# Patient Record
Sex: FEMALE | Race: WHITE | NOT HISPANIC OR LATINO | Employment: UNEMPLOYED | URBAN - METROPOLITAN AREA
[De-identification: names, ages, dates, MRNs, and addresses within clinical notes are randomized per-mention and may not be internally consistent; named-entity substitution may affect disease eponyms.]

---

## 2021-01-29 ENCOUNTER — TELEPHONE (OUTPATIENT)
Dept: SPORTS MEDICINE | Facility: CLINIC | Age: 51
End: 2021-01-29

## 2021-09-10 ENCOUNTER — OFFICE VISIT (OUTPATIENT)
Dept: INTERNAL MEDICINE | Facility: CLINIC | Age: 51
End: 2021-09-10
Payer: COMMERCIAL

## 2021-09-10 ENCOUNTER — TELEPHONE (OUTPATIENT)
Dept: INTERNAL MEDICINE | Facility: CLINIC | Age: 51
End: 2021-09-10

## 2021-09-10 VITALS — HEIGHT: 68 IN | BODY MASS INDEX: 32.58 KG/M2 | WEIGHT: 215 LBS

## 2021-09-10 DIAGNOSIS — Z12.4 ENCOUNTER FOR PAPANICOLAOU SMEAR FOR CERVICAL CANCER SCREENING: ICD-10-CM

## 2021-09-10 DIAGNOSIS — L50.9 HIVES OF UNKNOWN ORIGIN: ICD-10-CM

## 2021-09-10 DIAGNOSIS — R21 RASH AND NONSPECIFIC SKIN ERUPTION: Primary | ICD-10-CM

## 2021-09-10 DIAGNOSIS — Z00.00 ANNUAL PHYSICAL EXAM: ICD-10-CM

## 2021-09-10 DIAGNOSIS — F32.9 MAJOR DEPRESSION, CHRONIC: ICD-10-CM

## 2021-09-10 DIAGNOSIS — E66.09 CLASS 1 OBESITY DUE TO EXCESS CALORIES WITH BODY MASS INDEX (BMI) OF 32.0 TO 32.9 IN ADULT, UNSPECIFIED WHETHER SERIOUS COMORBIDITY PRESENT: ICD-10-CM

## 2021-09-10 DIAGNOSIS — Z80.0 FAMILY HISTORY OF COLON CANCER: ICD-10-CM

## 2021-09-10 DIAGNOSIS — F41.9 ANXIETY: ICD-10-CM

## 2021-09-10 PROBLEM — E66.811 CLASS 1 OBESITY DUE TO EXCESS CALORIES WITH BODY MASS INDEX (BMI) OF 32.0 TO 32.9 IN ADULT: Status: ACTIVE | Noted: 2021-09-10

## 2021-09-10 PROCEDURE — 1159F MED LIST DOCD IN RCRD: CPT | Mod: CPTII,,, | Performed by: INTERNAL MEDICINE

## 2021-09-10 PROCEDURE — 3008F BODY MASS INDEX DOCD: CPT | Mod: CPTII,,, | Performed by: INTERNAL MEDICINE

## 2021-09-10 PROCEDURE — 3008F PR BODY MASS INDEX (BMI) DOCUMENTED: ICD-10-PCS | Mod: CPTII,,, | Performed by: INTERNAL MEDICINE

## 2021-09-10 PROCEDURE — 99203 OFFICE O/P NEW LOW 30 MIN: CPT | Mod: 95,,, | Performed by: INTERNAL MEDICINE

## 2021-09-10 PROCEDURE — 99203 PR OFFICE/OUTPT VISIT, NEW, LEVL III, 30-44 MIN: ICD-10-PCS | Mod: 95,,, | Performed by: INTERNAL MEDICINE

## 2021-09-10 PROCEDURE — 1160F RVW MEDS BY RX/DR IN RCRD: CPT | Mod: CPTII,,, | Performed by: INTERNAL MEDICINE

## 2021-09-10 PROCEDURE — 1159F PR MEDICATION LIST DOCUMENTED IN MEDICAL RECORD: ICD-10-PCS | Mod: CPTII,,, | Performed by: INTERNAL MEDICINE

## 2021-09-10 PROCEDURE — 1160F PR REVIEW ALL MEDS BY PRESCRIBER/CLIN PHARMACIST DOCUMENTED: ICD-10-PCS | Mod: CPTII,,, | Performed by: INTERNAL MEDICINE

## 2021-09-10 RX ORDER — HYDROXYZINE PAMOATE 25 MG/1
25 CAPSULE ORAL EVERY 6 HOURS PRN
Qty: 120 CAPSULE | Refills: 3 | Status: SHIPPED | OUTPATIENT
Start: 2021-09-10 | End: 2022-10-20

## 2021-09-10 RX ORDER — HYDROXYZINE PAMOATE 25 MG/1
25 CAPSULE ORAL EVERY 6 HOURS PRN
COMMUNITY
Start: 2021-06-25 | End: 2021-09-10 | Stop reason: SDUPTHER

## 2021-09-10 RX ORDER — FAMOTIDINE 20 MG/1
20 TABLET, FILM COATED ORAL 2 TIMES DAILY
Qty: 60 TABLET | Refills: 6 | Status: SHIPPED | OUTPATIENT
Start: 2021-09-10 | End: 2022-10-20

## 2021-09-10 RX ORDER — SERTRALINE HYDROCHLORIDE 50 MG/1
50 TABLET, FILM COATED ORAL DAILY
Qty: 90 TABLET | Refills: 3 | Status: SHIPPED | OUTPATIENT
Start: 2021-09-10 | End: 2022-09-27

## 2021-09-10 RX ORDER — ALPRAZOLAM 1 MG/1
TABLET ORAL
COMMUNITY
Start: 2016-10-10 | End: 2021-09-10 | Stop reason: SDUPTHER

## 2021-09-10 RX ORDER — SERTRALINE HYDROCHLORIDE 50 MG/1
50 TABLET, FILM COATED ORAL DAILY
COMMUNITY
Start: 2021-06-21 | End: 2021-09-10 | Stop reason: SDUPTHER

## 2021-09-10 RX ORDER — CELECOXIB 100 MG/1
100 CAPSULE ORAL DAILY
Qty: 90 CAPSULE | Refills: 3 | Status: SHIPPED | OUTPATIENT
Start: 2021-09-10 | End: 2022-09-29 | Stop reason: SDUPTHER

## 2021-09-10 RX ORDER — CELECOXIB 100 MG/1
100 CAPSULE ORAL DAILY
COMMUNITY
Start: 2021-03-25 | End: 2021-09-10 | Stop reason: SDUPTHER

## 2021-09-10 RX ORDER — METHYLPREDNISOLONE 4 MG/1
TABLET ORAL
COMMUNITY
Start: 2021-06-25 | End: 2021-09-10

## 2021-09-10 RX ORDER — ALPRAZOLAM 1 MG/1
1 TABLET ORAL NIGHTLY PRN
Qty: 30 TABLET | Refills: 1 | Status: SHIPPED | OUTPATIENT
Start: 2021-09-10 | End: 2022-09-29 | Stop reason: SDUPTHER

## 2021-09-10 RX ORDER — EPINEPHRINE 0.3 MG/.3ML
INJECTION SUBCUTANEOUS
COMMUNITY
Start: 2021-06-25

## 2021-09-10 RX ORDER — FAMOTIDINE 20 MG/1
20 TABLET, FILM COATED ORAL 2 TIMES DAILY
COMMUNITY
Start: 2021-06-25 | End: 2021-09-10 | Stop reason: SDUPTHER

## 2021-10-13 DIAGNOSIS — Z12.31 OTHER SCREENING MAMMOGRAM: ICD-10-CM

## 2021-11-17 ENCOUNTER — TELEPHONE (OUTPATIENT)
Dept: INTERNAL MEDICINE | Facility: CLINIC | Age: 51
End: 2021-11-17
Payer: COMMERCIAL

## 2021-12-15 ENCOUNTER — PATIENT MESSAGE (OUTPATIENT)
Dept: ENDOSCOPY | Facility: HOSPITAL | Age: 51
End: 2021-12-15
Payer: COMMERCIAL

## 2021-12-21 ENCOUNTER — PATIENT MESSAGE (OUTPATIENT)
Dept: ENDOSCOPY | Facility: HOSPITAL | Age: 51
End: 2021-12-21
Payer: COMMERCIAL

## 2022-01-18 ENCOUNTER — PATIENT MESSAGE (OUTPATIENT)
Dept: ADMINISTRATIVE | Facility: HOSPITAL | Age: 52
End: 2022-01-18
Payer: COMMERCIAL

## 2022-02-07 ENCOUNTER — TELEPHONE (OUTPATIENT)
Dept: INTERNAL MEDICINE | Facility: CLINIC | Age: 52
End: 2022-02-07
Payer: COMMERCIAL

## 2022-02-07 NOTE — TELEPHONE ENCOUNTER
Please contact her, she was supposed to schedule a colonoscopy but the order .  Is she willing to schedule this?    I also recommend that she schedule an appointment with me to establish care and go over all of her health maintenance, please schedule at her convenience, thank you    If she has another PCP, please let me know

## 2022-02-15 NOTE — TELEPHONE ENCOUNTER
I called and left a detailed message on her recorder to call back to arrange a physical appointment and to find out if she wants to schedule a colonoscopy.  Do you want me to send her a letter?

## 2022-03-16 ENCOUNTER — PATIENT MESSAGE (OUTPATIENT)
Dept: ADMINISTRATIVE | Facility: HOSPITAL | Age: 52
End: 2022-03-16
Payer: COMMERCIAL

## 2022-05-30 ENCOUNTER — PATIENT MESSAGE (OUTPATIENT)
Dept: ADMINISTRATIVE | Facility: HOSPITAL | Age: 52
End: 2022-05-30
Payer: COMMERCIAL

## 2022-08-24 ENCOUNTER — PATIENT MESSAGE (OUTPATIENT)
Dept: ADMINISTRATIVE | Facility: HOSPITAL | Age: 52
End: 2022-08-24
Payer: COMMERCIAL

## 2022-09-27 RX ORDER — SERTRALINE HYDROCHLORIDE 50 MG/1
TABLET, FILM COATED ORAL
Qty: 90 TABLET | Refills: 0 | Status: SHIPPED | OUTPATIENT
Start: 2022-09-27 | End: 2022-09-29 | Stop reason: SDUPTHER

## 2022-09-27 NOTE — TELEPHONE ENCOUNTER
Refill Decision Note   Toya Holbrook  is requesting a refill authorization.  Brief Assessment and Rationale for Refill:  Approve    -Medication-Related Problems Identified:   Requires appointment  Requires labs  Medication Therapy Plan:       Medication Reconciliation Completed: No   Comments:     Provider Staff:     Action is required for this patient.   Please see care gap opportunities below in Care Due Message.     Thanks!  [facility]     Appointments      Date Provider   Last Visit   9/10/2021 Jaclyn Callahan MD   Next Visit   Visit date not found Jaclyn Callahan MD     Note composed:10:46 AM 09/27/2022           Note composed:10:46 AM 09/27/2022

## 2022-09-27 NOTE — TELEPHONE ENCOUNTER
Care Due:                  Date            Visit Type   Department     Provider  --------------------------------------------------------------------------------                                MYCHART                              SAME DAY                              VIRTUAL      [facility] INTERNAL  Last Visit: 09-      VISIT        MEDICINE       Jaclyn Callahan  Next Visit: None Scheduled  None         None Found                                                            Last  Test          Frequency    Reason                     Performed    Due Date  --------------------------------------------------------------------------------    Office Visit  12 months..  famotidine, sertraline...  09-   09-    Cr..........  12 months..  famotidine...............  Not Found    Overdue    [employer] Embedded Care Gaps. Reference number: [id number]. 9/27/2022   10:09:06 AM CDT

## 2022-09-29 RX ORDER — ALPRAZOLAM 1 MG/1
1 TABLET ORAL NIGHTLY PRN
Qty: 30 TABLET | Refills: 0 | Status: SHIPPED | OUTPATIENT
Start: 2022-09-29 | End: 2023-01-30 | Stop reason: SDUPTHER

## 2022-09-29 RX ORDER — SERTRALINE HYDROCHLORIDE 50 MG/1
50 TABLET, FILM COATED ORAL DAILY
Qty: 90 TABLET | Refills: 0 | Status: SHIPPED | OUTPATIENT
Start: 2022-09-29 | End: 2023-01-30 | Stop reason: SDUPTHER

## 2022-09-29 RX ORDER — CELECOXIB 100 MG/1
100 CAPSULE ORAL DAILY PRN
Qty: 30 CAPSULE | Refills: 0 | Status: SHIPPED | OUTPATIENT
Start: 2022-09-29 | End: 2022-10-11 | Stop reason: SDUPTHER

## 2022-09-29 NOTE — TELEPHONE ENCOUNTER
No new care gaps identified.  [facility] Embedded Care Gaps. Reference number: [id number]. 9/29/2022   11:40:43 AM MIGUELT

## 2022-10-11 ENCOUNTER — LAB VISIT (OUTPATIENT)
Dept: LAB | Facility: HOSPITAL | Age: 52
End: 2022-10-11
Payer: COMMERCIAL

## 2022-10-11 ENCOUNTER — PATIENT MESSAGE (OUTPATIENT)
Dept: INTERNAL MEDICINE | Facility: CLINIC | Age: 52
End: 2022-10-11
Payer: COMMERCIAL

## 2022-10-11 DIAGNOSIS — Z00.00 ANNUAL PHYSICAL EXAM: Primary | ICD-10-CM

## 2022-10-11 DIAGNOSIS — Z00.00 ANNUAL PHYSICAL EXAM: ICD-10-CM

## 2022-10-11 DIAGNOSIS — F41.9 ANXIETY: ICD-10-CM

## 2022-10-11 DIAGNOSIS — L50.9 HIVES OF UNKNOWN ORIGIN: ICD-10-CM

## 2022-10-11 DIAGNOSIS — F32.9 MAJOR DEPRESSION, CHRONIC: Primary | ICD-10-CM

## 2022-10-11 DIAGNOSIS — F32.9 MAJOR DEPRESSION, CHRONIC: ICD-10-CM

## 2022-10-11 LAB
25(OH)D3+25(OH)D2 SERPL-MCNC: 51 NG/ML (ref 30–96)
ALBUMIN SERPL BCP-MCNC: 4.3 G/DL (ref 3.5–5.2)
ALP SERPL-CCNC: 63 U/L (ref 55–135)
ALT SERPL W/O P-5'-P-CCNC: 38 U/L (ref 10–44)
ANION GAP SERPL CALC-SCNC: 13 MMOL/L (ref 8–16)
AST SERPL-CCNC: 37 U/L (ref 10–40)
BASOPHILS # BLD AUTO: 0.06 K/UL (ref 0–0.2)
BASOPHILS NFR BLD: 0.8 % (ref 0–1.9)
BILIRUB SERPL-MCNC: 1.1 MG/DL (ref 0.1–1)
BUN SERPL-MCNC: 7 MG/DL (ref 6–20)
CALCIUM SERPL-MCNC: 10.1 MG/DL (ref 8.7–10.5)
CHLORIDE SERPL-SCNC: 101 MMOL/L (ref 95–110)
CHOLEST SERPL-MCNC: 185 MG/DL (ref 120–199)
CHOLEST/HDLC SERPL: 2.2 {RATIO} (ref 2–5)
CO2 SERPL-SCNC: 28 MMOL/L (ref 23–29)
CREAT SERPL-MCNC: 0.7 MG/DL (ref 0.5–1.4)
DIFFERENTIAL METHOD: ABNORMAL
EOSINOPHIL # BLD AUTO: 0.3 K/UL (ref 0–0.5)
EOSINOPHIL NFR BLD: 3.4 % (ref 0–8)
ERYTHROCYTE [DISTWIDTH] IN BLOOD BY AUTOMATED COUNT: 13.4 % (ref 11.5–14.5)
EST. GFR  (NO RACE VARIABLE): >60 ML/MIN/1.73 M^2
ESTIMATED AVG GLUCOSE: 108 MG/DL (ref 68–131)
GLUCOSE SERPL-MCNC: 111 MG/DL (ref 70–110)
HBA1C MFR BLD: 5.4 % (ref 4–5.6)
HCT VFR BLD AUTO: 42.7 % (ref 37–48.5)
HDLC SERPL-MCNC: 85 MG/DL (ref 40–75)
HDLC SERPL: 45.9 % (ref 20–50)
HGB BLD-MCNC: 14 G/DL (ref 12–16)
IMM GRANULOCYTES # BLD AUTO: 0.02 K/UL (ref 0–0.04)
IMM GRANULOCYTES NFR BLD AUTO: 0.3 % (ref 0–0.5)
LDLC SERPL CALC-MCNC: 86.6 MG/DL (ref 63–159)
LYMPHOCYTES # BLD AUTO: 2.7 K/UL (ref 1–4.8)
LYMPHOCYTES NFR BLD: 37.1 % (ref 18–48)
MCH RBC QN AUTO: 31.3 PG (ref 27–31)
MCHC RBC AUTO-ENTMCNC: 32.8 G/DL (ref 32–36)
MCV RBC AUTO: 96 FL (ref 82–98)
MONOCYTES # BLD AUTO: 0.6 K/UL (ref 0.3–1)
MONOCYTES NFR BLD: 8 % (ref 4–15)
NEUTROPHILS # BLD AUTO: 3.7 K/UL (ref 1.8–7.7)
NEUTROPHILS NFR BLD: 50.4 % (ref 38–73)
NONHDLC SERPL-MCNC: 100 MG/DL
NRBC BLD-RTO: 0 /100 WBC
PLATELET # BLD AUTO: 294 K/UL (ref 150–450)
PMV BLD AUTO: 10.4 FL (ref 9.2–12.9)
POTASSIUM SERPL-SCNC: 4.1 MMOL/L (ref 3.5–5.1)
PROT SERPL-MCNC: 7.4 G/DL (ref 6–8.4)
RBC # BLD AUTO: 4.47 M/UL (ref 4–5.4)
SODIUM SERPL-SCNC: 142 MMOL/L (ref 136–145)
TRIGL SERPL-MCNC: 67 MG/DL (ref 30–150)
TSH SERPL DL<=0.005 MIU/L-ACNC: 2.11 UIU/ML (ref 0.4–4)
WBC # BLD AUTO: 7.25 K/UL (ref 3.9–12.7)

## 2022-10-11 PROCEDURE — 85025 COMPLETE CBC W/AUTO DIFF WBC: CPT | Performed by: NURSE PRACTITIONER

## 2022-10-11 PROCEDURE — 36415 COLL VENOUS BLD VENIPUNCTURE: CPT | Mod: PO | Performed by: NURSE PRACTITIONER

## 2022-10-11 PROCEDURE — 80061 LIPID PANEL: CPT | Performed by: NURSE PRACTITIONER

## 2022-10-11 PROCEDURE — 80053 COMPREHEN METABOLIC PANEL: CPT | Performed by: NURSE PRACTITIONER

## 2022-10-11 PROCEDURE — 83921 ORGANIC ACID SINGLE QUANT: CPT | Performed by: NURSE PRACTITIONER

## 2022-10-11 PROCEDURE — 82607 VITAMIN B-12: CPT | Performed by: NURSE PRACTITIONER

## 2022-10-11 PROCEDURE — 82306 VITAMIN D 25 HYDROXY: CPT | Performed by: NURSE PRACTITIONER

## 2022-10-11 PROCEDURE — 83036 HEMOGLOBIN GLYCOSYLATED A1C: CPT | Performed by: NURSE PRACTITIONER

## 2022-10-11 PROCEDURE — 84443 ASSAY THYROID STIM HORMONE: CPT | Performed by: NURSE PRACTITIONER

## 2022-10-13 LAB — VIT B12 SERPL-MCNC: 206 NG/L (ref 180–914)

## 2022-10-14 LAB — METHYLMALONATE SERPL-SCNC: 0.2 NMOL/ML

## 2022-10-20 ENCOUNTER — IMMUNIZATION (OUTPATIENT)
Dept: INTERNAL MEDICINE | Facility: CLINIC | Age: 52
End: 2022-10-20
Payer: COMMERCIAL

## 2022-10-20 ENCOUNTER — HOSPITAL ENCOUNTER (OUTPATIENT)
Dept: RADIOLOGY | Facility: HOSPITAL | Age: 52
Discharge: HOME | End: 2022-10-20
Attending: NURSE PRACTITIONER
Payer: COMMERCIAL

## 2022-10-20 ENCOUNTER — PATIENT MESSAGE (OUTPATIENT)
Dept: INTERNAL MEDICINE | Facility: CLINIC | Age: 52
End: 2022-10-20

## 2022-10-20 ENCOUNTER — TELEPHONE (OUTPATIENT)
Dept: ORTHOPEDICS | Facility: CLINIC | Age: 52
End: 2022-10-20
Payer: COMMERCIAL

## 2022-10-20 ENCOUNTER — OFFICE VISIT (OUTPATIENT)
Dept: INTERNAL MEDICINE | Facility: CLINIC | Age: 52
End: 2022-10-20
Payer: COMMERCIAL

## 2022-10-20 ENCOUNTER — CLINICAL SUPPORT (OUTPATIENT)
Dept: INTERNAL MEDICINE | Facility: CLINIC | Age: 52
End: 2022-10-20
Payer: COMMERCIAL

## 2022-10-20 VITALS
SYSTOLIC BLOOD PRESSURE: 130 MMHG | WEIGHT: 227.5 LBS | HEIGHT: 68 IN | HEART RATE: 90 BPM | BODY MASS INDEX: 34.48 KG/M2 | DIASTOLIC BLOOD PRESSURE: 96 MMHG | OXYGEN SATURATION: 99 %

## 2022-10-20 DIAGNOSIS — K92.2 GASTROINTESTINAL HEMORRHAGE, UNSPECIFIED GASTROINTESTINAL HEMORRHAGE TYPE: ICD-10-CM

## 2022-10-20 DIAGNOSIS — Z80.0 FAMILY HISTORY OF COLON CANCER: ICD-10-CM

## 2022-10-20 DIAGNOSIS — Z00.00 ANNUAL PHYSICAL EXAM: Primary | ICD-10-CM

## 2022-10-20 DIAGNOSIS — F32.9 MAJOR DEPRESSION, CHRONIC: ICD-10-CM

## 2022-10-20 DIAGNOSIS — F41.9 ANXIETY: ICD-10-CM

## 2022-10-20 DIAGNOSIS — R10.9 ABDOMINAL PAIN, UNSPECIFIED ABDOMINAL LOCATION: ICD-10-CM

## 2022-10-20 DIAGNOSIS — Z23 NEED FOR VACCINATION: Primary | ICD-10-CM

## 2022-10-20 DIAGNOSIS — E53.8 B12 DEFICIENCY: Primary | ICD-10-CM

## 2022-10-20 DIAGNOSIS — M25.569 KNEE PAIN, UNSPECIFIED CHRONICITY, UNSPECIFIED LATERALITY: ICD-10-CM

## 2022-10-20 LAB
BILIRUB UR QL STRIP: NEGATIVE
CLARITY UR REFRACT.AUTO: CLEAR
COLOR UR AUTO: YELLOW
GLUCOSE UR QL STRIP: NEGATIVE
HGB UR QL STRIP: NEGATIVE
KETONES UR QL STRIP: NEGATIVE
LEUKOCYTE ESTERASE UR QL STRIP: NEGATIVE
NITRITE UR QL STRIP: NEGATIVE
PH UR STRIP: 5 [PH] (ref 5–8)
PROT UR QL STRIP: NEGATIVE
SP GR UR STRIP: 1.01 (ref 1–1.03)
URN SPEC COLLECT METH UR: NORMAL

## 2022-10-20 PROCEDURE — 99386 PR PREVENTIVE VISIT,NEW,40-64: ICD-10-PCS | Mod: 25,S$GLB,, | Performed by: NURSE PRACTITIONER

## 2022-10-20 PROCEDURE — 3080F PR MOST RECENT DIASTOLIC BLOOD PRESSURE >= 90 MM HG: ICD-10-PCS | Mod: CPTII,S$GLB,, | Performed by: NURSE PRACTITIONER

## 2022-10-20 PROCEDURE — 25500020 PHARM REV CODE 255: Performed by: NURSE PRACTITIONER

## 2022-10-20 PROCEDURE — 99999 PR PBB SHADOW E&M-EST. PATIENT-LVL I: CPT | Mod: PBBFAC,,,

## 2022-10-20 PROCEDURE — 99386 PREV VISIT NEW AGE 40-64: CPT | Mod: 25,S$GLB,, | Performed by: NURSE PRACTITIONER

## 2022-10-20 PROCEDURE — 90471 IMMUNIZATION ADMIN: CPT | Mod: S$GLB,,, | Performed by: INTERNAL MEDICINE

## 2022-10-20 PROCEDURE — 90471 FLU VACCINE (QUAD) GREATER THAN OR EQUAL TO 3YO PRESERVATIVE FREE IM: ICD-10-PCS | Mod: S$GLB,,, | Performed by: INTERNAL MEDICINE

## 2022-10-20 PROCEDURE — 74174 CTA ABD&PLVS W/CONTRAST: CPT | Mod: 26,,, | Performed by: RADIOLOGY

## 2022-10-20 PROCEDURE — 3044F HG A1C LEVEL LT 7.0%: CPT | Mod: CPTII,S$GLB,, | Performed by: NURSE PRACTITIONER

## 2022-10-20 PROCEDURE — 90686 IIV4 VACC NO PRSV 0.5 ML IM: CPT | Mod: S$GLB,,, | Performed by: INTERNAL MEDICINE

## 2022-10-20 PROCEDURE — 96372 THER/PROPH/DIAG INJ SC/IM: CPT | Mod: S$GLB,,, | Performed by: NURSE PRACTITIONER

## 2022-10-20 PROCEDURE — 90686 FLU VACCINE (QUAD) GREATER THAN OR EQUAL TO 3YO PRESERVATIVE FREE IM: ICD-10-PCS | Mod: S$GLB,,, | Performed by: INTERNAL MEDICINE

## 2022-10-20 PROCEDURE — 99999 PR PBB SHADOW E&M-EST. PATIENT-LVL I: ICD-10-PCS | Mod: PBBFAC,,,

## 2022-10-20 PROCEDURE — 74174 CTA ABD&PLVS W/CONTRAST: CPT | Mod: TC

## 2022-10-20 PROCEDURE — 91312 COVID-19, MRNA, LNP-S, BIVALENT BOOSTER, PF, 30 MCG/0.3 ML DOSE: CPT | Mod: S$GLB,,, | Performed by: INTERNAL MEDICINE

## 2022-10-20 PROCEDURE — 74174 CTA ABDOMEN AND PELVIS: ICD-10-PCS | Mod: 26,,, | Performed by: RADIOLOGY

## 2022-10-20 PROCEDURE — 91312 COVID-19, MRNA, LNP-S, BIVALENT BOOSTER, PF, 30 MCG/0.3 ML DOSE: ICD-10-PCS | Mod: S$GLB,,, | Performed by: INTERNAL MEDICINE

## 2022-10-20 PROCEDURE — 99999 PR PBB SHADOW E&M-EST. PATIENT-LVL IV: ICD-10-PCS | Mod: PBBFAC,,, | Performed by: NURSE PRACTITIONER

## 2022-10-20 PROCEDURE — 81003 URINALYSIS AUTO W/O SCOPE: CPT | Performed by: NURSE PRACTITIONER

## 2022-10-20 PROCEDURE — 96372 PR INJECTION,THERAP/PROPH/DIAG2ST, IM OR SUBCUT: ICD-10-PCS | Mod: S$GLB,,, | Performed by: NURSE PRACTITIONER

## 2022-10-20 PROCEDURE — 3044F PR MOST RECENT HEMOGLOBIN A1C LEVEL <7.0%: ICD-10-PCS | Mod: CPTII,S$GLB,, | Performed by: NURSE PRACTITIONER

## 2022-10-20 PROCEDURE — 3075F PR MOST RECENT SYSTOLIC BLOOD PRESS GE 130-139MM HG: ICD-10-PCS | Mod: CPTII,S$GLB,, | Performed by: NURSE PRACTITIONER

## 2022-10-20 PROCEDURE — 3080F DIAST BP >= 90 MM HG: CPT | Mod: CPTII,S$GLB,, | Performed by: NURSE PRACTITIONER

## 2022-10-20 PROCEDURE — 3075F SYST BP GE 130 - 139MM HG: CPT | Mod: CPTII,S$GLB,, | Performed by: NURSE PRACTITIONER

## 2022-10-20 PROCEDURE — 99999 PR PBB SHADOW E&M-EST. PATIENT-LVL IV: CPT | Mod: PBBFAC,,, | Performed by: NURSE PRACTITIONER

## 2022-10-20 PROCEDURE — 0124A COVID-19, MRNA, LNP-S, BIVALENT BOOSTER, PF, 30 MCG/0.3 ML DOSE: CPT | Mod: CV19,PBBFAC | Performed by: INTERNAL MEDICINE

## 2022-10-20 RX ORDER — CYANOCOBALAMIN 1000 UG/ML
1000 INJECTION, SOLUTION INTRAMUSCULAR; SUBCUTANEOUS
Status: DISCONTINUED | OUTPATIENT
Start: 2022-10-21 | End: 2022-11-17

## 2022-10-20 RX ORDER — MELOXICAM 15 MG/1
15 TABLET ORAL DAILY
Qty: 30 TABLET | Refills: 1 | Status: SHIPPED | OUTPATIENT
Start: 2022-10-20 | End: 2022-10-20

## 2022-10-20 RX ADMIN — IOHEXOL 100 ML: 350 INJECTION, SOLUTION INTRAVENOUS at 05:10

## 2022-10-20 RX ADMIN — CYANOCOBALAMIN 1000 MCG: 1000 INJECTION, SOLUTION INTRAMUSCULAR; SUBCUTANEOUS at 04:10

## 2022-10-20 NOTE — PROGRESS NOTES
Verified patient name and . Advised patient to wait 15 mins. post injection. Of vitamin B12 to right outer gluteal area Patient tolerated well.

## 2022-10-20 NOTE — PROGRESS NOTES
"Internal Medicine Annual Exam       CHIEF COMPLAINT     The patient, Toya Holbrook, who is a 51 y.o. female presents for an annual exam.    HPI   Here for annual     DM2- dx with in . Has been controlled with diet and exercise     Right knee pain - has treated with celebrex 200mg . Has been on for 10 years.   Fell and hyperextended right knee 2022 - seen by outside ortho "xrays normal and told it was a bone bruise"   Ortho -    Also with c/o abd cramping and one episode of bloody stool and since that time black stools. No longer having pain but at onset was across the whole abdomen   No dizziness or fatigue     H/o angioedema and urticaria - was seen by allergist     HM-  MMG- needs   C-scope- needs   Flu- needs   Covid- needs booster   Tdap- needs         Past Medical History:  Past Medical History:   Diagnosis Date    Anemia     Anxiety     Depression     Diabetes mellitus, type 2        Past Surgical History:   Procedure Laterality Date    ADENOIDECTOMY       SECTION      x 2    CHOLECYSTECTOMY      COSMETIC SURGERY      EYE SURGERY      HYSTERECTOMY      LAPAROSCOPIC GASTRIC BANDING  2010    TONSILLECTOMY          Family History   Problem Relation Age of Onset    Cancer Mother         kidney    Diabetes Mother     Arthritis Mother     Depression Mother     Thyroid disease Father     Heart disease Father         MI    Alcohol abuse Father     Cancer Sister         cervical    Cancer Brother 50        colon    Diabetes Brother     No Known Problems Daughter     No Known Problems Son     Diabetes Maternal Grandmother     Diabetes Paternal Grandmother         Social History     Socioeconomic History    Marital status:     Number of children: 2   Tobacco Use    Smoking status: Light Smoker     Packs/day: 0.25     Years: 2.00     Pack years: 0.50     Types: Cigarettes    Smokeless tobacco: Never   Substance and Sexual Activity    Alcohol use: Yes     Alcohol/week: 8.0 standard " drinks     Types: 4 Glasses of wine, 4 Drinks containing 0.5 oz of alcohol per week     Comment: 8-14 drinks per week    Drug use: Yes     Frequency: 2.0 times per week     Types: Marijuana     Comment: for sleep - insomnia - edible    Sexual activity: Yes     Partners: Male     Birth control/protection: Condom        Social History     Tobacco Use   Smoking Status Light Smoker    Packs/day: 0.25    Years: 2.00    Pack years: 0.50    Types: Cigarettes   Smokeless Tobacco Never        Allergies as of 10/20/2022    (No Known Allergies)          Home Medications:  Prior to Admission medications    Medication Sig Start Date End Date Taking? Authorizing Provider   ALPRAZolam (XANAX) 1 MG tablet Take 1 tablet (1 mg total) by mouth nightly as needed for Anxiety. 22  Yes Jaclyn Callahan MD   celecoxib (CELEBREX) 100 MG capsule Take 1 capsule (100 mg total) by mouth daily as needed for Pain. 10/12/22  Yes Jaclyn Callahan MD   sertraline (ZOLOFT) 50 MG tablet Take 1 tablet (50 mg total) by mouth once daily. 22  Yes Jaclyn Callahan MD   EPINEPHrine (EPIPEN) 0.3 mg/0.3 mL AtIn SMARTSI Pre-Filled Pen Syringe IM Once 21   Historical Provider   famotidine (PEPCID) 20 MG tablet Take 1 tablet (20 mg total) by mouth 2 (two) times daily.  Patient not taking: Reported on 10/20/2022 9/10/21   Jaclyn Callahan MD   hydrOXYzine pamoate (VISTARIL) 25 MG Cap Take 1 capsule (25 mg total) by mouth every 6 (six) hours as needed.  Patient not taking: Reported on 10/20/2022 9/10/21   Jaclyn Callahan MD       Review of Systems:  Review of Systems   Constitutional:  Positive for fatigue. Negative for chills, diaphoresis and fever.   HENT:  Positive for postnasal drip and sneezing.    Eyes:  Negative for visual disturbance.   Respiratory:  Negative for cough, shortness of breath and wheezing.    Cardiovascular:  Negative for chest pain, palpitations and leg swelling.   Gastrointestinal:  Positive for abdominal pain and blood  "in stool. Negative for diarrhea, nausea and vomiting.   Genitourinary:  Positive for urgency (with urge incontinence).   Neurological:  Negative for dizziness, light-headedness and headaches.     Health Maintainence:   Immunizations:  Health Maintenance         Date Due Completion Date    Hepatitis C Screening Never done ---    COVID-19 Vaccine (1) Never done ---    Pneumococcal Vaccines (Age 0-64) (1 - PCV) Never done ---    HIV Screening Never done ---    TETANUS VACCINE Never done ---    Mammogram Never done ---    Colorectal Cancer Screening Never done ---    Shingles Vaccine (1 of 2) Never done ---    Influenza Vaccine (1) Never done ---    Lipid Panel 10/11/2027 10/11/2022             PHYSICAL EXAM     BP (!) 130/96 (BP Location: Right arm, Patient Position: Sitting, BP Method: Medium (Manual))   Pulse 90   Ht 5' 8" (1.727 m)   Wt 103.2 kg (227 lb 8.2 oz)   SpO2 99%   BMI 34.59 kg/m²  Body mass index is 34.59 kg/m².    Physical Exam  Vitals reviewed.   Constitutional:       Appearance: She is well-developed.   HENT:      Head: Normocephalic.      Right Ear: External ear normal.      Left Ear: External ear normal.      Nose: Nose normal.      Mouth/Throat:      Pharynx: No oropharyngeal exudate.   Eyes:      Pupils: Pupils are equal, round, and reactive to light.   Neck:      Thyroid: No thyromegaly.      Vascular: No JVD.      Trachea: No tracheal deviation.   Cardiovascular:      Rate and Rhythm: Normal rate and regular rhythm.      Heart sounds: Normal heart sounds. No murmur heard.    No friction rub. No gallop.   Pulmonary:      Effort: Pulmonary effort is normal. No respiratory distress.      Breath sounds: Normal breath sounds. No wheezing or rales.   Abdominal:      General: Bowel sounds are normal. There is no distension.      Palpations: Abdomen is soft.      Tenderness: There is abdominal tenderness in the right lower quadrant and left lower quadrant.       Musculoskeletal:         General: " Normal range of motion.      Cervical back: Neck supple.      Right knee: Crepitus present. Tenderness present over the medial joint line. No LCL laxity, MCL laxity, ACL laxity or PCL laxity. Normal alignment, normal meniscus and normal patellar mobility. Normal pulse.      Left knee: Normal.   Lymphadenopathy:      Cervical: No cervical adenopathy.   Skin:     General: Skin is warm and dry.      Findings: No rash.   Neurological:      Mental Status: She is alert and oriented to person, place, and time.   Psychiatric:         Behavior: Behavior normal.       LABS     Lab Results   Component Value Date    HGBA1C 5.4 10/11/2022     CMP  Sodium   Date Value Ref Range Status   10/11/2022 142 136 - 145 mmol/L Final     Potassium   Date Value Ref Range Status   10/11/2022 4.1 3.5 - 5.1 mmol/L Final     Chloride   Date Value Ref Range Status   10/11/2022 101 95 - 110 mmol/L Final     CO2   Date Value Ref Range Status   10/11/2022 28 23 - 29 mmol/L Final     Glucose   Date Value Ref Range Status   10/11/2022 111 (H) 70 - 110 mg/dL Final     BUN   Date Value Ref Range Status   10/11/2022 7 6 - 20 mg/dL Final     Creatinine   Date Value Ref Range Status   10/11/2022 0.7 0.5 - 1.4 mg/dL Final     Calcium   Date Value Ref Range Status   10/11/2022 10.1 8.7 - 10.5 mg/dL Final     Total Protein   Date Value Ref Range Status   10/11/2022 7.4 6.0 - 8.4 g/dL Final     Albumin   Date Value Ref Range Status   10/11/2022 4.3 3.5 - 5.2 g/dL Final     Total Bilirubin   Date Value Ref Range Status   10/11/2022 1.1 (H) 0.1 - 1.0 mg/dL Final     Comment:     For infants and newborns, interpretation of results should be based  on gestational age, weight and in agreement with clinical  observations.    Premature Infant recommended reference ranges:  Up to 24 hours.............<8.0 mg/dL  Up to 48 hours............<12.0 mg/dL  3-5 days..................<15.0 mg/dL  6-29 days.................<15.0 mg/dL       Alkaline Phosphatase   Date Value  Ref Range Status   10/11/2022 63 55 - 135 U/L Final     AST   Date Value Ref Range Status   10/11/2022 37 10 - 40 U/L Final     ALT   Date Value Ref Range Status   10/11/2022 38 10 - 44 U/L Final     Anion Gap   Date Value Ref Range Status   10/11/2022 13 8 - 16 mmol/L Final     Lab Results   Component Value Date    WBC 7.25 10/11/2022    HGB 14.0 10/11/2022    HCT 42.7 10/11/2022    MCV 96 10/11/2022     10/11/2022     Lab Results   Component Value Date    CHOL 185 10/11/2022     Lab Results   Component Value Date    HDL 85 (H) 10/11/2022     Lab Results   Component Value Date    LDLCALC 86.6 10/11/2022     Lab Results   Component Value Date    TRIG 67 10/11/2022     Lab Results   Component Value Date    CHOLHDL 45.9 10/11/2022     Lab Results   Component Value Date    TSH 2.113 10/11/2022       ASSESSMENT/PLAN     Toya Holbrook is a 51 y.o. female    Annual physical exam- All age and gender related screenings discussed   -     Urinalysis    Major depression, chronic/Anxiety- stable. Will cont zoloft     Family history of colon cancer: older brother- will send for C-scope     Abdominal pain, unspecified abdominal location- will send for ct scan and refer for coloscopy. U/a today   -     Cancel: CT Abdomen Pelvis  Without Contrast; Future; Expected date: 10/20/2022  -     Ambulatory referral/consult to Endo Procedure ; Future; Expected date: 10/21/2022  -     Urinalysis    Gastrointestinal hemorrhage, unspecified gastrointestinal hemorrhage type- will send for ct scan and refer for coloscopy. U/a today   -     Cancel: CT Abdomen Pelvis  Without Contrast; Future; Expected date: 10/20/2022  -     Ambulatory referral/consult to Endo Procedure ; Future; Expected date: 10/21/2022    Knee pain, unspecified chronicity, unspecified laterality  -     Ambulatory referral/consult to Orthopedics; Future; Expected date: 10/27/2022  -     Discontinue: meloxicam (MOBIC) 15 MG tablet; Take 1 tablet (15  mg total) by mouth once daily.  Dispense: 30 tablet; Refill: 1    Other orders  -     cyanocobalamin injection 1,000 mcg         Follow up with PCP in 1 year  for annual     Cris Egan-REFUGIO Cornejo, FNP-c   Department of Internal Medicine - [facility]  1:35 PM

## 2022-10-20 NOTE — TELEPHONE ENCOUNTER
Spoke to patient in regards to rescheduling her appointment. Patient schedule to see Mrs. Lin on 10/24/2022 for 10:30 am. Patient stated thank you. Thanks.

## 2022-10-24 ENCOUNTER — HOSPITAL ENCOUNTER (OUTPATIENT)
Dept: RADIOLOGY | Facility: HOSPITAL | Age: 52
Discharge: HOME | End: 2022-10-24
Attending: PHYSICIAN ASSISTANT
Payer: COMMERCIAL

## 2022-10-24 ENCOUNTER — OFFICE VISIT (OUTPATIENT)
Dept: ORTHOPEDICS | Facility: CLINIC | Age: 52
End: 2022-10-24
Payer: COMMERCIAL

## 2022-10-24 VITALS — BODY MASS INDEX: 34.11 KG/M2 | HEIGHT: 68 IN | WEIGHT: 225.06 LBS

## 2022-10-24 DIAGNOSIS — M17.11 PRIMARY OSTEOARTHRITIS OF RIGHT KNEE: Primary | ICD-10-CM

## 2022-10-24 DIAGNOSIS — R52 PAIN: ICD-10-CM

## 2022-10-24 DIAGNOSIS — M25.561 ACUTE PAIN OF RIGHT KNEE: ICD-10-CM

## 2022-10-24 DIAGNOSIS — M25.569 KNEE PAIN, UNSPECIFIED CHRONICITY, UNSPECIFIED LATERALITY: ICD-10-CM

## 2022-10-24 PROCEDURE — 1160F RVW MEDS BY RX/DR IN RCRD: CPT | Mod: CPTII,S$GLB,, | Performed by: PHYSICIAN ASSISTANT

## 2022-10-24 PROCEDURE — 3044F HG A1C LEVEL LT 7.0%: CPT | Mod: CPTII,S$GLB,, | Performed by: PHYSICIAN ASSISTANT

## 2022-10-24 PROCEDURE — 99999 PR PBB SHADOW E&M-EST. PATIENT-LVL III: CPT | Mod: PBBFAC,,, | Performed by: PHYSICIAN ASSISTANT

## 2022-10-24 PROCEDURE — 1159F MED LIST DOCD IN RCRD: CPT | Mod: CPTII,S$GLB,, | Performed by: PHYSICIAN ASSISTANT

## 2022-10-24 PROCEDURE — 1160F PR REVIEW ALL MEDS BY PRESCRIBER/CLIN PHARMACIST DOCUMENTED: ICD-10-PCS | Mod: CPTII,S$GLB,, | Performed by: PHYSICIAN ASSISTANT

## 2022-10-24 PROCEDURE — 3044F PR MOST RECENT HEMOGLOBIN A1C LEVEL <7.0%: ICD-10-PCS | Mod: CPTII,S$GLB,, | Performed by: PHYSICIAN ASSISTANT

## 2022-10-24 PROCEDURE — 73560 X-RAY EXAM OF KNEE 1 OR 2: CPT | Mod: 59,TC,LT

## 2022-10-24 PROCEDURE — 20610 DRAIN/INJ JOINT/BURSA W/O US: CPT | Mod: RT,S$GLB,, | Performed by: PHYSICIAN ASSISTANT

## 2022-10-24 PROCEDURE — 73560 XR KNEE ORTHO RIGHT: ICD-10-PCS | Mod: 26,LT,, | Performed by: RADIOLOGY

## 2022-10-24 PROCEDURE — 99203 OFFICE O/P NEW LOW 30 MIN: CPT | Mod: 25,S$GLB,, | Performed by: PHYSICIAN ASSISTANT

## 2022-10-24 PROCEDURE — 73562 XR KNEE ORTHO RIGHT: ICD-10-PCS | Mod: 26,RT,, | Performed by: RADIOLOGY

## 2022-10-24 PROCEDURE — 99999 PR PBB SHADOW E&M-EST. PATIENT-LVL III: ICD-10-PCS | Mod: PBBFAC,,, | Performed by: PHYSICIAN ASSISTANT

## 2022-10-24 PROCEDURE — 99203 PR OFFICE/OUTPT VISIT, NEW, LEVL III, 30-44 MIN: ICD-10-PCS | Mod: 25,S$GLB,, | Performed by: PHYSICIAN ASSISTANT

## 2022-10-24 PROCEDURE — 73560 X-RAY EXAM OF KNEE 1 OR 2: CPT | Mod: 26,LT,, | Performed by: RADIOLOGY

## 2022-10-24 PROCEDURE — 1159F PR MEDICATION LIST DOCUMENTED IN MEDICAL RECORD: ICD-10-PCS | Mod: CPTII,S$GLB,, | Performed by: PHYSICIAN ASSISTANT

## 2022-10-24 PROCEDURE — 20610 LARGE JOINT ASPIRATION/INJECTION: R KNEE: ICD-10-PCS | Mod: RT,S$GLB,, | Performed by: PHYSICIAN ASSISTANT

## 2022-10-24 PROCEDURE — 73562 X-RAY EXAM OF KNEE 3: CPT | Mod: 26,RT,, | Performed by: RADIOLOGY

## 2022-10-24 RX ORDER — LANOLIN ALCOHOL/MO/W.PET/CERES
1000 CREAM (GRAM) TOPICAL DAILY
Qty: 30 TABLET | Refills: 12 | Status: SHIPPED | OUTPATIENT
Start: 2022-10-24 | End: 2023-01-30 | Stop reason: SDUPTHER

## 2022-10-24 RX ORDER — TRIAMCINOLONE ACETONIDE 40 MG/ML
40 INJECTION, SUSPENSION INTRA-ARTICULAR; INTRAMUSCULAR
Status: DISCONTINUED | OUTPATIENT
Start: 2022-10-24 | End: 2022-10-24 | Stop reason: HOSPADM

## 2022-10-24 RX ORDER — CELECOXIB 100 MG/1
100 CAPSULE ORAL DAILY
COMMUNITY
Start: 2022-10-20 | End: 2022-11-16

## 2022-10-24 RX ADMIN — TRIAMCINOLONE ACETONIDE 40 MG: 40 INJECTION, SUSPENSION INTRA-ARTICULAR; INTRAMUSCULAR at 10:10

## 2022-10-24 NOTE — PROCEDURES
Large Joint Aspiration/Injection: R knee    Date/Time: 10/24/2022 10:30 AM  Performed by: Debby Lin PA-C  Authorized by: Debby Lni PA-C     Consent Done?:  Yes (Verbal)  Indications:  Pain  Prep: patient was prepped and draped in usual sterile fashion    Local anesthetic:  Topical anesthetic    Details:  Needle Size:  22 G  Approach:  Anterolateral  Location:  Knee  Site:  R knee  Medications:  40 mg triamcinolone acetonide 40 mg/mL  Patient tolerance:  Patient tolerated the procedure well with no immediate complications

## 2022-10-24 NOTE — PROGRESS NOTES
SUBJECTIVE:     Chief Complaint   Patient presents with    Right Knee - Pain       History of Present Illness:  Toya Holbrook is a 51 y.o. year old female here with complaints of intermittent right knee pain which started about 10 years ago. The pain worsened after a hyperextension injury a few months ago.  The pain is located in the global aspect of the knee.  The pain is described as achy. There is not radiation.  There is not catching or locking.  Aggravating factors include going up and down stairs, rising after sitting, and walking.  Associated symptoms include knee giving out. Previous treatments include rest, meloxicam which have provided minimal relief.  Over the years she has also been on celebrex. She has not tried any injections in the past.  There is not a history of previous injury or surgery to the knee.  The patient does not use an assistive device.    Review of patient's allergies indicates:  No Known Allergies      Current Outpatient Medications   Medication Sig Dispense Refill    ALPRAZolam (XANAX) 1 MG tablet Take 1 tablet (1 mg total) by mouth nightly as needed for Anxiety. 30 tablet 0    EPINEPHrine (EPIPEN) 0.3 mg/0.3 mL AtIn SMARTSI Pre-Filled Pen Syringe IM Once      meloxicam (MOBIC) 15 MG tablet TAKE 1 TABLET(15 MG) BY MOUTH EVERY DAY 90 tablet 0    sertraline (ZOLOFT) 50 MG tablet Take 1 tablet (50 mg total) by mouth once daily. 90 tablet 0     Current Facility-Administered Medications   Medication Dose Route Frequency Provider Last Rate Last Admin    cyanocobalamin injection 1,000 mcg  1,000 mcg Subcutaneous Q30 Days Cris Peralta NP   1,000 mcg at 10/20/22 1619       Past Medical History:   Diagnosis Date    Anemia     Anxiety     Depression     Diabetes mellitus, type 2     Migraine        Past Surgical History:   Procedure Laterality Date    ADENOIDECTOMY       SECTION      x 2    CHOLECYSTECTOMY      COSMETIC SURGERY      EYE SURGERY      HYSTERECTOMY  "     LAPAROSCOPIC GASTRIC BANDING  2010    TONSILLECTOMY  1978         Review of Systems:  ROS:  Constitutional: no fever or chills  Eyes: no visual changes  ENT: no nasal congestion or sore throat  Respiratory: no cough or shortness of breath  Musculoskeletal: no arthralgias or myalgias      OBJECTIVE:     PHYSICAL EXAM:  Height: 5' 8" (172.7 cm) Weight: 102.1 kg (225 lb 1.4 oz)   General: Well developed, well nourished, in no acute distress.  Neurological: Mood & affect are normal.  HEENT: NCAT, sclera nonicteric   Lungs: Respirations are equal and unlabored.   CV: 2+ bilateral upper and lower extremity pulses.   Skin: Intact throughout with no rashes, erythema, or lesions  Extremities: No LE edema, no erythema or warmth of the skin in either lower extremity.    right Knee Exam:  Knee Range of Motion: 0-120   Effusion: none  Condition of skin: intact  Location of tenderness: Medial joint line and Pes anserinus   Strength: 5 of 5 quadriceps strength and 5 of 5 hamstring strength  Stability:  stable to testing  Varus /Valgus stress:  normal  Nora:  positive    Right Hip Examination:  Painless passive ROM    IMAGING:    X-rays of the right knee, personally reviewed by me, demonstrate mild DJD with medial joint space narrowing, subchondral sclerosis.  No fracture or dislocation.     ASSESSMENT/PLAN:   51 y.o. year old female with right knee osteoarthritis    Plan: We discussed with the patient at length all the different treatment options available for the knee including NSAIDS, acetaminophen, rest, ice, knee strengthening exercise, steroid injections for temporary relief, Viscosupplementation, and knee arthroplasty.   - She elected to try diagnostic/therapeutic CSI today- rest, ice, activity modification  - We can consider MRI if symptoms fail to improve  - Continue meloxicam as needed  - Follow up if symptoms worsen or fail to improve        "

## 2022-10-29 ENCOUNTER — PATIENT MESSAGE (OUTPATIENT)
Dept: ORTHOPEDICS | Facility: CLINIC | Age: 52
End: 2022-10-29
Payer: COMMERCIAL

## 2022-10-29 ENCOUNTER — HOSPITAL ENCOUNTER (EMERGENCY)
Facility: OTHER | Age: 52
Discharge: HOME | End: 2022-10-29
Attending: EMERGENCY MEDICINE
Payer: COMMERCIAL

## 2022-10-29 VITALS
WEIGHT: 220 LBS | TEMPERATURE: 98 F | SYSTOLIC BLOOD PRESSURE: 129 MMHG | DIASTOLIC BLOOD PRESSURE: 78 MMHG | RESPIRATION RATE: 16 BRPM | BODY MASS INDEX: 33.34 KG/M2 | HEIGHT: 68 IN | OXYGEN SATURATION: 99 % | HEART RATE: 66 BPM

## 2022-10-29 DIAGNOSIS — M23.91 ACUTE INTERNAL DERANGEMENT OF RIGHT KNEE: Primary | ICD-10-CM

## 2022-10-29 DIAGNOSIS — T14.90XA TRAUMA: ICD-10-CM

## 2022-10-29 PROCEDURE — 99283 EMERGENCY DEPT VISIT LOW MDM: CPT | Mod: 25

## 2022-10-29 PROCEDURE — 25000003 PHARM REV CODE 250: Performed by: EMERGENCY MEDICINE

## 2022-10-29 PROCEDURE — 29505 APPLICATION LONG LEG SPLINT: CPT | Mod: RT

## 2022-10-29 RX ORDER — BENZONATATE 100 MG/1
100 CAPSULE ORAL 3 TIMES DAILY PRN
Status: DISCONTINUED | OUTPATIENT
Start: 2022-10-29 | End: 2022-10-29

## 2022-10-29 RX ORDER — KETOROLAC TROMETHAMINE 10 MG/1
10 TABLET, FILM COATED ORAL
Status: COMPLETED | OUTPATIENT
Start: 2022-10-29 | End: 2022-10-29

## 2022-10-29 RX ORDER — HYDROCODONE BITARTRATE AND ACETAMINOPHEN 5; 325 MG/1; MG/1
1 TABLET ORAL
Status: COMPLETED | OUTPATIENT
Start: 2022-10-29 | End: 2022-10-29

## 2022-10-29 RX ORDER — HYDROCODONE BITARTRATE AND ACETAMINOPHEN 5; 325 MG/1; MG/1
1 TABLET ORAL EVERY 6 HOURS PRN
Qty: 12 TABLET | Refills: 0 | Status: SHIPPED | OUTPATIENT
Start: 2022-10-29 | End: 2022-11-01

## 2022-10-29 RX ADMIN — KETOROLAC TROMETHAMINE 10 MG: 10 TABLET, FILM COATED ORAL at 06:10

## 2022-10-29 RX ADMIN — HYDROCODONE BITARTRATE AND ACETAMINOPHEN 1 TABLET: 5; 325 TABLET ORAL at 07:10

## 2022-10-29 NOTE — ED PROVIDER NOTES
"Encounter Date: 10/29/2022       History     Chief Complaint   Patient presents with    Knee Pain     Pt advised on 10/28/2022 her right knee " went out " and the pain is progressively getting worse     51-year-old female with a past medical history of anxiety, depression, diabetes mellitus and osteoarthritis of the right knee presents complaining of sudden onset of severe medial knee pain while pivoting on her feet yesterday.  Patient states that she has been having knee pain for which she received a corticosteroid injection in the right knee recently.  Patient was able to have bear weight and ambulate, but with significant pain.      The history is provided by the patient.   Review of patient's allergies indicates:  No Known Allergies  Past Medical History:   Diagnosis Date    Anemia     Anxiety     Depression     Diabetes mellitus, type 2     Migraine      Past Surgical History:   Procedure Laterality Date    ADENOIDECTOMY       SECTION      x 2    CHOLECYSTECTOMY      COSMETIC SURGERY      EYE SURGERY      HYSTERECTOMY      LAPAROSCOPIC GASTRIC BANDING  2010    TONSILLECTOMY       Family History   Problem Relation Age of Onset    Cancer Mother         kidney    Diabetes Mother     Arthritis Mother     Depression Mother     Thyroid disease Father     Heart disease Father         MI    Alcohol abuse Father     Cancer Sister         cervical    Cancer Brother 50        colon    Diabetes Brother     No Known Problems Daughter     No Known Problems Son     Diabetes Maternal Grandmother     Diabetes Paternal Grandmother      Social History     Tobacco Use    Smoking status: Light Smoker     Packs/day: 0.25     Years: 2.00     Pack years: 0.50     Types: Cigarettes    Smokeless tobacco: Never   Substance Use Topics    Alcohol use: Yes     Alcohol/week: 8.0 standard drinks     Types: 4 Glasses of wine, 4 Drinks containing 0.5 oz of alcohol per week     Comment: 8-14 drinks per week    Drug use: Yes "     Frequency: 2.0 times per week     Types: Marijuana     Comment: for sleep - insomnia - edible     Review of Systems   Constitutional:  Negative for fever.   HENT:  Negative for sore throat.    Respiratory:  Negative for shortness of breath.    Cardiovascular:  Negative for chest pain.   Gastrointestinal:  Negative for nausea.   Genitourinary:  Negative for dysuria.   Musculoskeletal:  Positive for arthralgias. Negative for back pain.   Skin:  Negative for rash.   Neurological:  Negative for weakness.   Hematological:  Does not bruise/bleed easily.   All other systems reviewed and are negative.    Physical Exam     Initial Vitals [10/29/22 0544]   BP Pulse Resp Temp SpO2   129/78 66 16 97.9 °F (36.6 °C) 99 %      MAP       --         Physical Exam    Nursing note and vitals reviewed.  Constitutional: She appears well-developed and well-nourished. She is not diaphoretic. No distress.   HENT:   Head: Normocephalic and atraumatic.   Right Ear: External ear normal.   Left Ear: External ear normal.   Eyes: Conjunctivae and EOM are normal. Pupils are equal, round, and reactive to light.   Neck: Neck supple. No tracheal deviation present.   Normal range of motion.  Cardiovascular:  Normal rate, regular rhythm, normal heart sounds and intact distal pulses.     Exam reveals no gallop and no friction rub.       No murmur heard.  Pulmonary/Chest: Breath sounds normal. No respiratory distress. She has no wheezes. She has no rhonchi. She has no rales. She exhibits no tenderness.   Musculoskeletal:         General: No edema. Normal range of motion.      Cervical back: Normal range of motion and neck supple.      Comments: Right knee:  No edema, no effusion, tenderness to palpation medial joint line, tenderness to palpation along medial collateral ligament, positive Nora's, negative Lachman's, supple compartments, neurovascularly intact distally     Neurological: She is alert and oriented to person, place, and time.   Skin:  Skin is warm and dry. Capillary refill takes less than 2 seconds.   Psychiatric: She has a normal mood and affect. Thought content normal.       ED Course   Procedures  Labs Reviewed - No data to display       Imaging Results              X-Ray Knee 3 View Right (Final result)  Result time 10/29/22 07:24:06      Final result by Kodak Cheney MD (10/29/22 07:24:06)                   Impression:      No acute displaced fracture.      Electronically signed by: Kodak Cheney MD  Date:    10/29/2022  Time:    07:24               Narrative:    EXAMINATION:  XR KNEE 3 VIEW RIGHT    CLINICAL HISTORY:  Injury, unspecified, initial encounter.    TECHNIQUE:  AP, lateral, and Merchant views of the right knee were performed.    COMPARISON:  10/24/2022.    FINDINGS:  No acute fracture or dislocation.  Mild degenerative changes.  No sizeable joint effusion.  No unexpected radiopaque foreign body.                                    X-Rays:   Independently Interpreted Readings:   Other Readings:  Right knee X-ray:  No acute fracture noted, degenerative changes noted, no dislocation  Medications   HYDROcodone-acetaminophen 5-325 mg per tablet 1 tablet (has no administration in time range)   ketorolac tablet 10 mg (10 mg Oral Given 10/29/22 0641)     Medical Decision Making:   History:   Old Medical Records: I decided to obtain old medical records.  Differential Diagnosis:   Fracture, dislocation, ligamentous injury, tenderness injury, neurovascular injury, muscular tear, arthritis  Independently Interpreted Test(s):   I have ordered and independently interpreted X-rays - see prior notes.  Clinical Tests:   Radiological Study: Ordered and Reviewed  ED Management:  Discussed with patient that I understand her desire for an MRI, but it is not an emergent indication; however, I did emphasized the need to follow up with orthopedist.  I have low suspicion of ACL injury or compartment syndrome.  We will place patient in knee  immobilizer, provided crutches, pain medications.  Patient discharged home in stable condition. Diagnosis and treatment plan explained to patient. No further workup indicated based on their complaints or examination today. Discussed results with the patient. I educated the patient/guardian on the warning signs and symptoms for which they must seek immediate medical attention. All questions addressed and patient/guardian were given discharge instructions and followup information.                           Clinical Impression:   Final diagnoses:  [T14.90XA] Trauma  [M23.91] Acute internal derangement of right knee (Primary)        ED Disposition Condition    Discharge Stable          ED Prescriptions       Medication Sig Dispense Start Date End Date Auth. Provider    HYDROcodone-acetaminophen (NORCO) 5-325 mg per tablet Take 1 tablet by mouth every 6 (six) hours as needed for Pain. 12 tablet 10/29/2022 11/1/2022 Sky Garcia MD          Follow-up Information       Follow up With Specialties Details Why Contact Info    Debby Lin PA-C Orthopedic Surgery Schedule an appointment as soon as possible for a visit in 3 days For follow-up and re-evaluation [address] [facility] [address]  [phone]      [facility] Emergency Dept Emergency Medicine  As needed, for any new or worsening symptoms [address]  [place] [address] [phone]             Sky Garcia MD  10/29/22 0483

## 2022-10-29 NOTE — ED TRIAGE NOTES
"Pt presents to the ED c/o knee pain. Pt reports chronic knee pain from arthritis that has gotten worse since working in the garden yesterday and she moved the wrong way. Pt requesting MRI of knee at this time stating "I don't want an xray because I know it will show everything is fine." Denies any other complaints at this time. AAOx4  "

## 2022-10-31 ENCOUNTER — TELEPHONE (OUTPATIENT)
Dept: ORTHOPEDICS | Facility: CLINIC | Age: 52
End: 2022-10-31
Payer: COMMERCIAL

## 2022-10-31 DIAGNOSIS — M25.561 ACUTE PAIN OF RIGHT KNEE: Primary | ICD-10-CM

## 2022-10-31 DIAGNOSIS — S89.91XA KNEE INJURY, RIGHT, INITIAL ENCOUNTER: ICD-10-CM

## 2022-10-31 RX ORDER — TRAMADOL HYDROCHLORIDE 50 MG/1
50 TABLET ORAL EVERY 6 HOURS PRN
Qty: 15 TABLET | Refills: 0 | Status: SHIPPED | OUTPATIENT
Start: 2022-10-31 | End: 2022-11-03

## 2022-10-31 NOTE — PROGRESS NOTES
Chronic right knee pain that has failed to improve with rest, NSAIDS, HEP, PT in the past  Recent worsening of pain with inability to bear weight  MRI right knee to evaluate symptoms

## 2022-10-31 NOTE — TELEPHONE ENCOUNTER
Pain was better after injection  She had twisting injury several days later with inability to bear weight. She felt a pop  She has tried brace, rest, ice  Can't tolerate hydrocodone- will send prescription for tramadol

## 2022-10-31 NOTE — TELEPHONE ENCOUNTER
Spoke to patient in regards to scheduling her MRI appointment. Patient schedule to have mri for 7:00 am on 11/01/2022. Patient stated thank you. Thanks.

## 2022-11-01 ENCOUNTER — PATIENT MESSAGE (OUTPATIENT)
Dept: ORTHOPEDICS | Facility: CLINIC | Age: 52
End: 2022-11-01
Payer: COMMERCIAL

## 2022-11-01 ENCOUNTER — HOSPITAL ENCOUNTER (OUTPATIENT)
Dept: RADIOLOGY | Facility: HOSPITAL | Age: 52
Discharge: HOME | End: 2022-11-01
Attending: PHYSICIAN ASSISTANT
Payer: COMMERCIAL

## 2022-11-01 DIAGNOSIS — M25.561 ACUTE PAIN OF RIGHT KNEE: ICD-10-CM

## 2022-11-01 PROCEDURE — 73721 MRI KNEE WITHOUT CONTRAST RIGHT: ICD-10-PCS | Mod: 26,RT,, | Performed by: RADIOLOGY

## 2022-11-01 PROCEDURE — 73721 MRI JNT OF LWR EXTRE W/O DYE: CPT | Mod: TC,RT

## 2022-11-01 PROCEDURE — 73721 MRI JNT OF LWR EXTRE W/O DYE: CPT | Mod: 26,RT,, | Performed by: RADIOLOGY

## 2022-11-03 ENCOUNTER — TELEPHONE (OUTPATIENT)
Dept: ORTHOPEDICS | Facility: CLINIC | Age: 52
End: 2022-11-03
Payer: COMMERCIAL

## 2022-11-03 DIAGNOSIS — S83.281A ACUTE TEAR LATERAL MENISCUS, RIGHT, INITIAL ENCOUNTER: Primary | ICD-10-CM

## 2022-11-03 RX ORDER — TRAMADOL HYDROCHLORIDE 50 MG/1
50 TABLET ORAL EVERY 6 HOURS PRN
Qty: 20 TABLET | Refills: 0 | Status: SHIPPED | OUTPATIENT
Start: 2022-11-03 | End: 2023-05-29 | Stop reason: ALTCHOICE

## 2022-11-03 NOTE — PROGRESS NOTES
Called patient to discuss MRI- she will continue rest, brace for now.  She is improving  Can also consider viscosupplementation  Tramadol sent-  reviewed  Declined PT at this time  Follow up if pain fails to improve

## 2022-11-03 NOTE — TELEPHONE ENCOUNTER
----- Message from Woody Carson sent at 11/3/2022 10:52 AM CDT -----  Regarding: PT'S RETURNING A CALL REGARDING MRI RESULTS  Contact: PT  Pt's requesting a call from staff..     Confirmed contact info below:  Contact Name: Toya Holbrook  Phone Number: [phone]

## 2022-11-17 ENCOUNTER — CLINICAL SUPPORT (OUTPATIENT)
Dept: INTERNAL MEDICINE | Facility: CLINIC | Age: 52
End: 2022-11-17
Payer: COMMERCIAL

## 2022-11-17 ENCOUNTER — PATIENT MESSAGE (OUTPATIENT)
Dept: PHARMACY | Facility: CLINIC | Age: 52
End: 2022-11-17
Payer: COMMERCIAL

## 2022-11-17 VITALS — HEART RATE: 67 BPM | SYSTOLIC BLOOD PRESSURE: 132 MMHG | DIASTOLIC BLOOD PRESSURE: 84 MMHG | OXYGEN SATURATION: 99 %

## 2022-11-17 PROCEDURE — 99999 PR PBB SHADOW E&M-EST. PATIENT-LVL III: CPT | Mod: PBBFAC,,,

## 2022-11-17 PROCEDURE — 96372 PR INJECTION,THERAP/PROPH/DIAG2ST, IM OR SUBCUT: ICD-10-PCS | Mod: S$GLB,,, | Performed by: NURSE PRACTITIONER

## 2022-11-17 PROCEDURE — 96372 THER/PROPH/DIAG INJ SC/IM: CPT | Mod: S$GLB,,, | Performed by: NURSE PRACTITIONER

## 2022-11-17 PROCEDURE — 99999 PR PBB SHADOW E&M-EST. PATIENT-LVL III: ICD-10-PCS | Mod: PBBFAC,,,

## 2022-11-17 RX ORDER — CYANOCOBALAMIN 1000 UG/ML
1000 INJECTION, SOLUTION INTRAMUSCULAR; SUBCUTANEOUS
Status: SHIPPED | OUTPATIENT
Start: 2022-11-17

## 2022-11-17 RX ADMIN — CYANOCOBALAMIN 1000 MCG: 1000 INJECTION, SOLUTION INTRAMUSCULAR; SUBCUTANEOUS at 10:11

## 2022-11-17 NOTE — PROGRESS NOTES
Patient here for nurse visit bp check. Bp 1342/84. Patient is not currently on any antihypertension medications. Patient declines any chest pains, dizziness or lightheadedness. pcp notified

## 2022-12-02 ENCOUNTER — TELEPHONE (OUTPATIENT)
Dept: ENDOSCOPY | Facility: HOSPITAL | Age: 52
End: 2022-12-02
Payer: COMMERCIAL

## 2022-12-16 ENCOUNTER — TELEPHONE (OUTPATIENT)
Dept: ENDOSCOPY | Facility: HOSPITAL | Age: 52
End: 2022-12-16
Payer: COMMERCIAL

## 2023-01-30 ENCOUNTER — IMMUNIZATION (OUTPATIENT)
Dept: PHARMACY | Facility: CLINIC | Age: 53
End: 2023-01-30
Payer: COMMERCIAL

## 2023-01-30 ENCOUNTER — PATIENT MESSAGE (OUTPATIENT)
Dept: INTERNAL MEDICINE | Facility: CLINIC | Age: 53
End: 2023-01-30
Payer: COMMERCIAL

## 2023-01-30 ENCOUNTER — HOSPITAL ENCOUNTER (OUTPATIENT)
Dept: RADIOLOGY | Facility: HOSPITAL | Age: 53
Discharge: HOME | End: 2023-01-30
Attending: INTERNAL MEDICINE
Payer: COMMERCIAL

## 2023-01-30 VITALS — BODY MASS INDEX: 33.34 KG/M2 | HEIGHT: 68 IN | WEIGHT: 220 LBS

## 2023-01-30 DIAGNOSIS — Z12.31 OTHER SCREENING MAMMOGRAM: ICD-10-CM

## 2023-01-30 DIAGNOSIS — E66.09 CLASS 1 OBESITY DUE TO EXCESS CALORIES WITH BODY MASS INDEX (BMI) OF 32.0 TO 32.9 IN ADULT, UNSPECIFIED WHETHER SERIOUS COMORBIDITY PRESENT: ICD-10-CM

## 2023-01-30 DIAGNOSIS — Z86.39 HISTORY OF DIABETES MELLITUS, TYPE II: Primary | ICD-10-CM

## 2023-01-30 DIAGNOSIS — E53.8 B12 DEFICIENCY: ICD-10-CM

## 2023-01-30 DIAGNOSIS — Z13.220 LIPID SCREENING: ICD-10-CM

## 2023-01-30 PROCEDURE — 77067 SCR MAMMO BI INCL CAD: CPT | Mod: TC

## 2023-01-30 PROCEDURE — 77063 MAMMO DIGITAL SCREENING BILAT WITH TOMO: ICD-10-PCS | Mod: 26,,, | Performed by: RADIOLOGY

## 2023-01-30 PROCEDURE — 77063 BREAST TOMOSYNTHESIS BI: CPT | Mod: 26,,, | Performed by: RADIOLOGY

## 2023-01-30 PROCEDURE — 77067 MAMMO DIGITAL SCREENING BILAT WITH TOMO: ICD-10-PCS | Mod: 26,,, | Performed by: RADIOLOGY

## 2023-01-30 PROCEDURE — 77067 SCR MAMMO BI INCL CAD: CPT | Mod: 26,,, | Performed by: RADIOLOGY

## 2023-01-30 RX ORDER — ALPRAZOLAM 1 MG/1
1 TABLET ORAL NIGHTLY PRN
Qty: 30 TABLET | Refills: 0 | Status: SHIPPED | OUTPATIENT
Start: 2023-01-30 | End: 2023-03-20 | Stop reason: SDUPTHER

## 2023-01-30 RX ORDER — CELECOXIB 100 MG/1
100 CAPSULE ORAL DAILY PRN
Qty: 30 CAPSULE | Refills: 0 | Status: SHIPPED | OUTPATIENT
Start: 2023-01-30 | End: 2023-02-01

## 2023-01-30 RX ORDER — LANOLIN ALCOHOL/MO/W.PET/CERES
1000 CREAM (GRAM) TOPICAL DAILY
Qty: 30 TABLET | Refills: 12 | Status: SHIPPED | OUTPATIENT
Start: 2023-01-30

## 2023-01-30 RX ORDER — SERTRALINE HYDROCHLORIDE 50 MG/1
50 TABLET, FILM COATED ORAL DAILY
Qty: 90 TABLET | Refills: 0 | Status: SHIPPED | OUTPATIENT
Start: 2023-01-30 | End: 2023-03-20 | Stop reason: SDUPTHER

## 2023-01-30 NOTE — TELEPHONE ENCOUNTER
Care Due:                  Date            Visit Type   Department     Provider  --------------------------------------------------------------------------------                                MYCHART                              SAME DAY                              VIRTUAL      [facility] INTERNAL  Last Visit: 09-      VISIT        MEDICINE       Jaclyn Callahan  Next Visit: None Scheduled  None         None Found                                                            Last  Test          Frequency    Reason                     Performed    Due Date  --------------------------------------------------------------------------------    Office Visit  12 months..  sertraline...............  09-   09-    Health [facility] Embedded Care Gaps. Reference number: [id number]. 1/30/2023   7:41:57 AM CST

## 2023-02-01 ENCOUNTER — LAB VISIT (OUTPATIENT)
Dept: LAB | Facility: HOSPITAL | Age: 53
End: 2023-02-01
Payer: COMMERCIAL

## 2023-02-01 ENCOUNTER — OFFICE VISIT (OUTPATIENT)
Dept: ORTHOPEDICS | Facility: CLINIC | Age: 53
End: 2023-02-01
Payer: COMMERCIAL

## 2023-02-01 ENCOUNTER — OFFICE VISIT (OUTPATIENT)
Dept: INTERNAL MEDICINE | Facility: CLINIC | Age: 53
End: 2023-02-01
Payer: COMMERCIAL

## 2023-02-01 VITALS
OXYGEN SATURATION: 99 % | WEIGHT: 236.56 LBS | DIASTOLIC BLOOD PRESSURE: 80 MMHG | HEIGHT: 68 IN | BODY MASS INDEX: 35.85 KG/M2 | HEART RATE: 77 BPM | SYSTOLIC BLOOD PRESSURE: 120 MMHG

## 2023-02-01 VITALS — BODY MASS INDEX: 35.85 KG/M2 | WEIGHT: 236.56 LBS | HEIGHT: 68 IN

## 2023-02-01 DIAGNOSIS — E53.8 B12 DEFICIENCY: ICD-10-CM

## 2023-02-01 DIAGNOSIS — S83.281A ACUTE TEAR LATERAL MENISCUS, RIGHT, INITIAL ENCOUNTER: Primary | ICD-10-CM

## 2023-02-01 DIAGNOSIS — Z86.39 HISTORY OF DIABETES MELLITUS, TYPE II: ICD-10-CM

## 2023-02-01 DIAGNOSIS — R53.83 FATIGUE, UNSPECIFIED TYPE: ICD-10-CM

## 2023-02-01 DIAGNOSIS — Z23 NEED FOR TDAP VACCINATION: ICD-10-CM

## 2023-02-01 DIAGNOSIS — F32.9 MAJOR DEPRESSION, CHRONIC: ICD-10-CM

## 2023-02-01 DIAGNOSIS — R63.5 WEIGHT GAIN: ICD-10-CM

## 2023-02-01 DIAGNOSIS — Z23 NEED FOR VACCINATION AGAINST STREPTOCOCCUS PNEUMONIAE: ICD-10-CM

## 2023-02-01 DIAGNOSIS — F41.9 ANXIETY: ICD-10-CM

## 2023-02-01 DIAGNOSIS — M17.11 PRIMARY OSTEOARTHRITIS OF RIGHT KNEE: ICD-10-CM

## 2023-02-01 DIAGNOSIS — M25.569 KNEE PAIN, UNSPECIFIED CHRONICITY, UNSPECIFIED LATERALITY: Primary | ICD-10-CM

## 2023-02-01 LAB
T4 FREE SERPL-MCNC: 0.88 NG/DL (ref 0.71–1.51)
TSH SERPL DL<=0.005 MIU/L-ACNC: 0.59 UIU/ML (ref 0.4–4)

## 2023-02-01 PROCEDURE — 1160F PR REVIEW ALL MEDS BY PRESCRIBER/CLIN PHARMACIST DOCUMENTED: ICD-10-PCS | Mod: CPTII,S$GLB,, | Performed by: PHYSICIAN ASSISTANT

## 2023-02-01 PROCEDURE — 99213 OFFICE O/P EST LOW 20 MIN: CPT | Mod: 25,S$GLB,, | Performed by: PHYSICIAN ASSISTANT

## 2023-02-01 PROCEDURE — 84439 ASSAY OF FREE THYROXINE: CPT | Performed by: NURSE PRACTITIONER

## 2023-02-01 PROCEDURE — 99999 PR PBB SHADOW E&M-EST. PATIENT-LVL III: CPT | Mod: PBBFAC,,, | Performed by: PHYSICIAN ASSISTANT

## 2023-02-01 PROCEDURE — 3008F BODY MASS INDEX DOCD: CPT | Mod: CPTII,S$GLB,, | Performed by: PHYSICIAN ASSISTANT

## 2023-02-01 PROCEDURE — 3074F SYST BP LT 130 MM HG: CPT | Mod: CPTII,S$GLB,, | Performed by: NURSE PRACTITIONER

## 2023-02-01 PROCEDURE — 3079F PR MOST RECENT DIASTOLIC BLOOD PRESSURE 80-89 MM HG: ICD-10-PCS | Mod: CPTII,S$GLB,, | Performed by: NURSE PRACTITIONER

## 2023-02-01 PROCEDURE — 99999 PR PBB SHADOW E&M-EST. PATIENT-LVL III: ICD-10-PCS | Mod: PBBFAC,,, | Performed by: NURSE PRACTITIONER

## 2023-02-01 PROCEDURE — 99999 PR PBB SHADOW E&M-EST. PATIENT-LVL III: ICD-10-PCS | Mod: PBBFAC,,, | Performed by: PHYSICIAN ASSISTANT

## 2023-02-01 PROCEDURE — 90471 TDAP VACCINE GREATER THAN OR EQUAL TO 7YO IM: ICD-10-PCS | Mod: S$GLB,,, | Performed by: NURSE PRACTITIONER

## 2023-02-01 PROCEDURE — 36415 COLL VENOUS BLD VENIPUNCTURE: CPT | Performed by: NURSE PRACTITIONER

## 2023-02-01 PROCEDURE — 90472 IMMUNIZATION ADMIN EACH ADD: CPT | Mod: S$GLB,,, | Performed by: NURSE PRACTITIONER

## 2023-02-01 PROCEDURE — 3074F PR MOST RECENT SYSTOLIC BLOOD PRESSURE < 130 MM HG: ICD-10-PCS | Mod: CPTII,S$GLB,, | Performed by: NURSE PRACTITIONER

## 2023-02-01 PROCEDURE — 90471 IMMUNIZATION ADMIN: CPT | Mod: S$GLB,,, | Performed by: NURSE PRACTITIONER

## 2023-02-01 PROCEDURE — 3044F HG A1C LEVEL LT 7.0%: CPT | Mod: CPTII,S$GLB,, | Performed by: PHYSICIAN ASSISTANT

## 2023-02-01 PROCEDURE — 1160F RVW MEDS BY RX/DR IN RCRD: CPT | Mod: CPTII,S$GLB,, | Performed by: PHYSICIAN ASSISTANT

## 2023-02-01 PROCEDURE — 3008F PR BODY MASS INDEX (BMI) DOCUMENTED: ICD-10-PCS | Mod: CPTII,S$GLB,, | Performed by: PHYSICIAN ASSISTANT

## 2023-02-01 PROCEDURE — 3008F PR BODY MASS INDEX (BMI) DOCUMENTED: ICD-10-PCS | Mod: CPTII,S$GLB,, | Performed by: NURSE PRACTITIONER

## 2023-02-01 PROCEDURE — 20610 LARGE JOINT ASPIRATION/INJECTION: R KNEE: ICD-10-PCS | Mod: RT,S$GLB,, | Performed by: PHYSICIAN ASSISTANT

## 2023-02-01 PROCEDURE — 3044F PR MOST RECENT HEMOGLOBIN A1C LEVEL <7.0%: ICD-10-PCS | Mod: CPTII,S$GLB,, | Performed by: NURSE PRACTITIONER

## 2023-02-01 PROCEDURE — 99213 PR OFFICE/OUTPT VISIT, EST, LEVL III, 20-29 MIN: ICD-10-PCS | Mod: 25,S$GLB,, | Performed by: PHYSICIAN ASSISTANT

## 2023-02-01 PROCEDURE — 99214 OFFICE O/P EST MOD 30 MIN: CPT | Mod: 25,S$GLB,, | Performed by: NURSE PRACTITIONER

## 2023-02-01 PROCEDURE — 86376 MICROSOMAL ANTIBODY EACH: CPT | Performed by: NURSE PRACTITIONER

## 2023-02-01 PROCEDURE — 84443 ASSAY THYROID STIM HORMONE: CPT | Performed by: NURSE PRACTITIONER

## 2023-02-01 PROCEDURE — 99999 PR PBB SHADOW E&M-EST. PATIENT-LVL III: CPT | Mod: PBBFAC,,, | Performed by: NURSE PRACTITIONER

## 2023-02-01 PROCEDURE — 3044F HG A1C LEVEL LT 7.0%: CPT | Mod: CPTII,S$GLB,, | Performed by: NURSE PRACTITIONER

## 2023-02-01 PROCEDURE — 3008F BODY MASS INDEX DOCD: CPT | Mod: CPTII,S$GLB,, | Performed by: NURSE PRACTITIONER

## 2023-02-01 PROCEDURE — 90472 PNEUMOCOCCAL CONJUGATE VACCINE 20-VALENT: ICD-10-PCS | Mod: S$GLB,,, | Performed by: NURSE PRACTITIONER

## 2023-02-01 PROCEDURE — 90677 PNEUMOCOCCAL CONJUGATE VACCINE 20-VALENT: ICD-10-PCS | Mod: S$GLB,,, | Performed by: NURSE PRACTITIONER

## 2023-02-01 PROCEDURE — 90715 TDAP VACCINE GREATER THAN OR EQUAL TO 7YO IM: ICD-10-PCS | Mod: S$GLB,,, | Performed by: NURSE PRACTITIONER

## 2023-02-01 PROCEDURE — 3079F DIAST BP 80-89 MM HG: CPT | Mod: CPTII,S$GLB,, | Performed by: NURSE PRACTITIONER

## 2023-02-01 PROCEDURE — 1159F MED LIST DOCD IN RCRD: CPT | Mod: CPTII,S$GLB,, | Performed by: PHYSICIAN ASSISTANT

## 2023-02-01 PROCEDURE — 3044F PR MOST RECENT HEMOGLOBIN A1C LEVEL <7.0%: ICD-10-PCS | Mod: CPTII,S$GLB,, | Performed by: PHYSICIAN ASSISTANT

## 2023-02-01 PROCEDURE — 20610 DRAIN/INJ JOINT/BURSA W/O US: CPT | Mod: RT,S$GLB,, | Performed by: PHYSICIAN ASSISTANT

## 2023-02-01 PROCEDURE — 1159F PR MEDICATION LIST DOCUMENTED IN MEDICAL RECORD: ICD-10-PCS | Mod: CPTII,S$GLB,, | Performed by: PHYSICIAN ASSISTANT

## 2023-02-01 PROCEDURE — 90715 TDAP VACCINE 7 YRS/> IM: CPT | Mod: S$GLB,,, | Performed by: NURSE PRACTITIONER

## 2023-02-01 PROCEDURE — 99214 PR OFFICE/OUTPT VISIT, EST, LEVL IV, 30-39 MIN: ICD-10-PCS | Mod: 25,S$GLB,, | Performed by: NURSE PRACTITIONER

## 2023-02-01 PROCEDURE — 90677 PCV20 VACCINE IM: CPT | Mod: S$GLB,,, | Performed by: NURSE PRACTITIONER

## 2023-02-01 RX ORDER — MELOXICAM 15 MG/1
15 TABLET ORAL DAILY
Qty: 30 TABLET | Refills: 3 | Status: SHIPPED | OUTPATIENT
Start: 2023-02-01 | End: 2023-05-25 | Stop reason: SDUPTHER

## 2023-02-01 RX ADMIN — CYANOCOBALAMIN 1000 MCG: 1000 INJECTION, SOLUTION INTRAMUSCULAR; SUBCUTANEOUS at 02:02

## 2023-02-01 RX ADMIN — TRIAMCINOLONE ACETONIDE 40 MG: 40 INJECTION, SUSPENSION INTRA-ARTICULAR; INTRAMUSCULAR at 03:02

## 2023-02-01 NOTE — PROGRESS NOTES
INTERNAL MEDICINE PROGRESS NOTE    CHIEF COMPLAINT     Chief Complaint   Patient presents with    Follow-up     Review labs        HPI     Toya Holbrook is a 52 y.o. female with anemia, anxiety, depression and DM2 who presents for a follow up visit today.    Pt seen for annual 10/2022    DM2- fasting labs glucose 125.   Lab Results   Component Value Date    HGBA1C 5.6 2023     B12 deficiency - taking 1000mcg by mouth   B12 level 428     Lipids at goal.     Right knee pain - referred to ortho at last appointment seen by ELENA Lin   Went to ED 10/29/2022 with acute knee pain   MRI 2022  Impression:     1. Complex tear of lateral meniscus.  2. Patellofemoral and lateral tibiofemoral chondral loss.  3. Moderate volume joint effusion    Also with cold symptoms - x 2 months. Blowing yellow sputum   No congestion   No fever, chills  No sinus pain or pressure       Past Medical History:  Past Medical History:   Diagnosis Date    Anemia     Anxiety     Depression     Diabetes mellitus, type 2     Migraine        Home Medications:  Prior to Admission medications    Medication Sig Start Date End Date Taking? Authorizing Provider   ALPRAZolam (XANAX) 1 MG tablet Take 1 tablet (1 mg total) by mouth nightly as needed for Anxiety. 23   Jaclyn Callaahn MD   celecoxib (CELEBREX) 100 MG capsule Take 1 capsule (100 mg total) by mouth daily as needed for Pain. 23   Jaclyn Callahan MD   cyanocobalamin (VITAMIN B-12) 1000 MCG tablet Take 1 tablet (1,000 mcg total) by mouth once daily. 23   Jaclyn Callahan MD   diphth,pertus,acell,,tetanus (BOOSTRIX TDAP) 2.5-8-5 Lf-mcg-Lf/0.5mL Syrg injection Inject 0.5 mLs into the muscle once. for 1 dose 23  Aislinn Henderson, YaoD   EPINEPHrine (EPIPEN) 0.3 mg/0.3 mL AtIn SMARTSI Pre-Filled Pen Syringe IM Once 21   Historical Provider   sertraline (ZOLOFT) 50 MG tablet Take 1 tablet (50 mg total) by mouth once daily. 23   Jaclyn Callahan MD  "  traMADoL (ULTRAM) 50 mg tablet Take 1 tablet (50 mg total) by mouth every 6 (six) hours as needed for Pain. 11/3/22   Debby Lin PA-C   varicella-zoster gE-AS01B, PF, (SHINGRIX) 50 mcg/0.5 mL injection Inject into the muscle. 1/30/23   Jesica Dillon, PharmD       Review of Systems:  Review of Systems   Constitutional:  Positive for activity change and unexpected weight change.   HENT:  Negative for hearing loss, rhinorrhea and trouble swallowing.    Eyes:  Negative for discharge and visual disturbance.   Respiratory:  Negative for chest tightness and wheezing.    Cardiovascular:  Negative for chest pain and palpitations.   Gastrointestinal:  Negative for blood in stool, constipation, diarrhea and vomiting.   Endocrine: Positive for polyuria. Negative for polydipsia.   Genitourinary:  Negative for difficulty urinating, dysuria, hematuria and menstrual problem.   Musculoskeletal:  Positive for arthralgias. Negative for joint swelling and neck pain.   Neurological:  Negative for weakness and headaches.   Psychiatric/Behavioral:  Positive for dysphoric mood. Negative for confusion.      Health Maintainence:   Immunizations:  Health Maintenance         Date Due Completion Date    Pneumococcal Vaccines (Age 0-64) (1 - PCV) Never done ---    TETANUS VACCINE Never done ---    Colorectal Cancer Screening Never done ---    Mammogram 11/07/2018 11/7/2017    Hemoglobin A1c (Diabetic Prevention Screening) 01/30/2026 1/30/2023    Lipid Panel 01/30/2028 1/30/2023             PHYSICAL EXAM     /80 (BP Location: Left arm, Patient Position: Sitting, BP Method: Medium (Manual))   Pulse 77   Ht 5' 8" (1.727 m)   Wt 107.3 kg (236 lb 8.9 oz)   SpO2 99%   BMI 35.97 kg/m²     Physical Exam  Constitutional:       Appearance: She is well-developed.   HENT:      Head: Normocephalic and atraumatic.   Eyes:      Pupils: Pupils are equal, round, and reactive to light.   Cardiovascular:      Rate and Rhythm: Normal rate and " regular rhythm.   Pulmonary:      Effort: Pulmonary effort is normal.   Musculoskeletal:      Right knee: Swelling, bony tenderness and crepitus present. Decreased range of motion. Tenderness present.        Legs:    Neurological:      Mental Status: She is alert and oriented to person, place, and time.       LABS     Lab Results   Component Value Date    HGBA1C 5.6 01/30/2023     CMP  Sodium   Date Value Ref Range Status   01/30/2023 140 136 - 145 mmol/L Final     Potassium   Date Value Ref Range Status   01/30/2023 4.1 3.5 - 5.1 mmol/L Final     Chloride   Date Value Ref Range Status   01/30/2023 106 95 - 110 mmol/L Final     CO2   Date Value Ref Range Status   01/30/2023 28 23 - 29 mmol/L Final     Glucose   Date Value Ref Range Status   01/30/2023 125 (H) 70 - 110 mg/dL Final     BUN   Date Value Ref Range Status   01/30/2023 13 6 - 20 mg/dL Final     Creatinine   Date Value Ref Range Status   01/30/2023 0.7 0.5 - 1.4 mg/dL Final     Calcium   Date Value Ref Range Status   01/30/2023 9.5 8.7 - 10.5 mg/dL Final     Total Protein   Date Value Ref Range Status   01/30/2023 7.2 6.0 - 8.4 g/dL Final     Albumin   Date Value Ref Range Status   01/30/2023 4.1 3.5 - 5.2 g/dL Final     Total Bilirubin   Date Value Ref Range Status   01/30/2023 0.5 0.1 - 1.0 mg/dL Final     Comment:     For infants and newborns, interpretation of results should be based  on gestational age, weight and in agreement with clinical  observations.    Premature Infant recommended reference ranges:  Up to 24 hours.............<8.0 mg/dL  Up to 48 hours............<12.0 mg/dL  3-5 days..................<15.0 mg/dL  6-29 days.................<15.0 mg/dL       Alkaline Phosphatase   Date Value Ref Range Status   01/30/2023 40 (L) 55 - 135 U/L Final     AST   Date Value Ref Range Status   01/30/2023 22 10 - 40 U/L Final     ALT   Date Value Ref Range Status   01/30/2023 35 10 - 44 U/L Final     Anion Gap   Date Value Ref Range Status   01/30/2023 6  (L) 8 - 16 mmol/L Final     Lab Results   Component Value Date    WBC 7.11 10/20/2022    HGB 13.6 10/20/2022    HCT 43.1 10/20/2022    MCV 98 10/20/2022     10/20/2022     Lab Results   Component Value Date    CHOL 177 01/30/2023    CHOL 181 10/20/2022    CHOL 185 10/11/2022     Lab Results   Component Value Date    HDL 61 01/30/2023    HDL 60 10/20/2022    HDL 85 (H) 10/11/2022     Lab Results   Component Value Date    LDLCALC 99.2 01/30/2023    LDLCALC 92.6 10/20/2022    LDLCALC 86.6 10/11/2022     Lab Results   Component Value Date    TRIG 84 01/30/2023    TRIG 142 10/20/2022    TRIG 67 10/11/2022     Lab Results   Component Value Date    CHOLHDL 34.5 01/30/2023    CHOLHDL 33.1 10/20/2022    CHOLHDL 45.9 10/11/2022     Lab Results   Component Value Date    TSH 0.586 02/01/2023       ASSESSMENT/PLAN     Toya Holbrook is a 52 y.o. female     Knee pain, unspecified chronicity, unspecified laterality- stable. Will cont meloxicam. F/u with ortho.  -     meloxicam (MOBIC) 15 MG tablet; Take 1 tablet (15 mg total) by mouth once daily.  Dispense: 30 tablet; Refill: 3    Anxiety/Major depression, chronic- stable. On zoloft     B12 deficiency- cont B12 injections monthly     History of diabetes mellitus, type II    Fatigue, unspecified type  -     TSH; Future; Expected date: 02/01/2023  -     T4, Free; Future; Expected date: 02/01/2023  -     THYROID PEROXIDASE ANTIBODY; Future; Expected date: 02/01/2023    Weight gain  -     TSH; Future; Expected date: 02/01/2023  -     T4, Free; Future; Expected date: 02/01/2023  -     THYROID PEROXIDASE ANTIBODY; Future; Expected date: 02/01/2023    Need for Tdap vaccination  -     (In Office Administered) Tdap Vaccine    Need for vaccination against Streptococcus pneumoniae  -     (In Office Administered) Pneumococcal Conjugate Vaccine (20 Valent) (IM)           Follow up with PCP     Patient education provided from [facility]. Patient was counseled on when and how to seek  emergent care.       Cris HIGHTOWER, REFUGIO, FNP-c   Department of Internal Medicine - [facility]  2:24 PM

## 2023-02-01 NOTE — PROGRESS NOTES
SUBJECTIVE:     Chief Complaint   Patient presents with    Right Knee - Pain       History of Present Illness:    Interval 23  She continues to complain of intermittent pain in the right knee.  Pain has never resolved since her last visit several months ago.  She has swelling and pain worse with activity.  She is not interested in surgical options at this time.  She has recently switched to meloxicam.    SAMMY Holbrook is a 52 y.o. year old female here with complaints of intermittent right knee pain which started about 10 years ago. The pain worsened after a hyperextension injury a few months ago.  The pain is located in the global aspect of the knee.  The pain is described as achy. There is not radiation.  There is not catching or locking.  Aggravating factors include going up and down stairs, rising after sitting, and walking.  Associated symptoms include knee giving out. Previous treatments include rest, meloxicam which have provided minimal relief.  Over the years she has also been on celebrex. She has not tried any injections in the past.  There is not a history of previous injury or surgery to the knee.  The patient does not use an assistive device.    Review of patient's allergies indicates:  No Known Allergies      Current Outpatient Medications   Medication Sig Dispense Refill    ALPRAZolam (XANAX) 1 MG tablet Take 1 tablet (1 mg total) by mouth nightly as needed for Anxiety. 30 tablet 0    cyanocobalamin (VITAMIN B-12) 1000 MCG tablet Take 1 tablet (1,000 mcg total) by mouth once daily. 30 tablet 12    EPINEPHrine (EPIPEN) 0.3 mg/0.3 mL AtIn SMARTSI Pre-Filled Pen Syringe IM Once      meloxicam (MOBIC) 15 MG tablet Take 1 tablet (15 mg total) by mouth once daily. 30 tablet 3    sertraline (ZOLOFT) 50 MG tablet Take 1 tablet (50 mg total) by mouth once daily. 90 tablet 0    traMADoL (ULTRAM) 50 mg tablet Take 1 tablet (50 mg total) by mouth every 6 (six) hours as needed for Pain. 20 tablet  "0    varicella-zoster gE-AS01B, PF, (SHINGRIX) 50 mcg/0.5 mL injection Inject into the muscle. 1 each 0     Current Facility-Administered Medications   Medication Dose Route Frequency Provider Last Rate Last Admin    cyanocobalamin injection 1,000 mcg  1,000 mcg Intramuscular Q30 Days Cris ShahBITA Weller   1,000 mcg at 23 1455       Past Medical History:   Diagnosis Date    Anemia     Anxiety     Depression     Diabetes mellitus, type 2     Migraine        Past Surgical History:   Procedure Laterality Date    ADENOIDECTOMY       SECTION      x 2    CHOLECYSTECTOMY      COSMETIC SURGERY      EYE SURGERY      HYSTERECTOMY      LAPAROSCOPIC GASTRIC BANDING  2010    OOPHORECTOMY      TONSILLECTOMY           Review of Systems:  ROS:  Constitutional: no fever or chills  Eyes: no visual changes  ENT: no nasal congestion or sore throat  Respiratory: no cough or shortness of breath  Musculoskeletal: no arthralgias or myalgias      OBJECTIVE:     PHYSICAL EXAM:  Height: 5' 8" (172.7 cm) Weight: 107.3 kg (236 lb 8.9 oz)   General: Well developed, well nourished, in no acute distress.  Neurological: Mood & affect are normal.  HEENT: NCAT, sclera nonicteric   Lungs: Respirations are equal and unlabored.   CV: 2+ bilateral upper and lower extremity pulses.   Skin: Intact throughout with no rashes, erythema, or lesions  Extremities: No LE edema, no erythema or warmth of the skin in either lower extremity.    right Knee Exam:  Knee Range of Motion: 0-120   Effusion: none  Condition of skin: intact  Location of tenderness: Medial joint line, lateral joint line  Strength: 5 of 5 quadriceps strength and 5 of 5 hamstring strength  Stability:  stable to testing  Varus /Valgus stress:  normal  Nora:  positive    Right Hip Examination:  Painless passive ROM    IMAGING:    X-rays of the right knee, personally reviewed by me, demonstrate mild DJD with medial joint space narrowing, subchondral sclerosis.  " No fracture or dislocation.     MRI right knee     1. Complex tear of lateral meniscus.  2. Patellofemoral and lateral tibiofemoral chondral loss.  3. Moderate volume joint effusion.    ASSESSMENT/PLAN:   52 y.o. year old female with right knee osteoarthritis, lateral meniscal tear    Plan: We discussed with the patient at length all the different treatment options available for the knee including NSAIDS, acetaminophen, rest, ice, knee strengthening exercise, steroid injections for temporary relief, Viscosupplementation, and knee arthroplasty.     - Repeat right knee CSI today- see procedure note  - Consider synvisc-one- will let me know when she returns depending on relief with CSI  - Meloxicam as needed  - PT referral  - Follow up if symptoms worsen or fail to improve

## 2023-02-02 ENCOUNTER — PATIENT MESSAGE (OUTPATIENT)
Dept: INTERNAL MEDICINE | Facility: CLINIC | Age: 53
End: 2023-02-02
Payer: COMMERCIAL

## 2023-02-02 LAB — THYROPEROXIDASE IGG SERPL-ACNC: <6 IU/ML

## 2023-02-03 RX ORDER — TRIAMCINOLONE ACETONIDE 40 MG/ML
40 INJECTION, SUSPENSION INTRA-ARTICULAR; INTRAMUSCULAR
Status: DISCONTINUED | OUTPATIENT
Start: 2023-02-01 | End: 2023-02-03 | Stop reason: HOSPADM

## 2023-02-03 NOTE — PROCEDURES
Large Joint Aspiration/Injection: R knee    Date/Time: 2/1/2023 3:00 PM  Performed by: Debby Lin PA-C  Authorized by: Debby Lin PA-C     Consent Done?:  Yes (Verbal)  Indications:  Pain  Prep: patient was prepped and draped in usual sterile fashion    Local anesthetic:  Topical anesthetic    Details:  Needle Size:  22 G  Approach:  Anterolateral  Location:  Knee  Site:  R knee  Medications:  40 mg triamcinolone acetonide 40 mg/mL  Patient tolerance:  Patient tolerated the procedure well with no immediate complications

## 2023-02-09 ENCOUNTER — CLINICAL SUPPORT (OUTPATIENT)
Dept: REHABILITATION | Facility: HOSPITAL | Age: 53
End: 2023-02-09
Payer: COMMERCIAL

## 2023-02-09 DIAGNOSIS — M17.11 PRIMARY OSTEOARTHRITIS OF RIGHT KNEE: ICD-10-CM

## 2023-02-09 DIAGNOSIS — M25.561 CHRONIC PAIN OF RIGHT KNEE: ICD-10-CM

## 2023-02-09 DIAGNOSIS — R26.2 DIFFICULTY WALKING: ICD-10-CM

## 2023-02-09 DIAGNOSIS — G89.29 CHRONIC PAIN OF RIGHT KNEE: ICD-10-CM

## 2023-02-09 DIAGNOSIS — M25.661 DECREASED RANGE OF MOTION OF RIGHT KNEE: ICD-10-CM

## 2023-02-09 DIAGNOSIS — S83.281A ACUTE TEAR LATERAL MENISCUS, RIGHT, INITIAL ENCOUNTER: ICD-10-CM

## 2023-02-09 DIAGNOSIS — R29.898 DECREASED STRENGTH OF LOWER EXTREMITY: ICD-10-CM

## 2023-02-09 PROCEDURE — 97162 PT EVAL MOD COMPLEX 30 MIN: CPT | Mod: PN

## 2023-02-09 PROCEDURE — 97110 THERAPEUTIC EXERCISES: CPT | Mod: PN

## 2023-02-09 NOTE — PLAN OF CARE
[facility]   Physical Therapy Initial Evaluation     Date: 2/9/2023   Name: Toya Holbrook  Clinic Number: [id number]    Therapy Diagnosis:   Encounter Diagnoses   Name Primary?    Acute tear lateral meniscus, right, initial encounter     Primary osteoarthritis of right knee     Chronic pain of right knee     Decreased range of motion of right knee     Decreased strength of lower extremity     Difficulty walking      Physician: Debby Lin PA-C    Physician Orders: PT Eval and Treat   Medical Diagnosis from Referral: S83.281A (ICD-10-CM) - Acute tear lateral meniscus, right, initial encounter M17.11 (ICD-10-CM) - Primary osteoarthritis of right knee   Evaluation Date: 2/9/2023  Authorization Period Expiration: 2/1/2024  Plan of Care Expiration: 5/4/2023  Progress Note Due: 3/9/2023  Visit # / Visits authorized: 1/ 1   FOTO: 1/3    Precautions: Standard     Time In: 10:45 am  Time Out: 11:30 am  Total Appointment Time (timed & untimed codes): 45 minutes      SUBJECTIVE     Date of onset: June of 2022    History of current condition - Toya reports: started in June when she was walking down steps and missed a step which hyperextended her knee which was the original injury. Progressively gotten worse. Patient reports that she has good days and bad days. Patient reports sometimes she wakes up and has excruciating pain and unable to step on it, or rolling over in bed. Patient reports that she is unable to walk prolonged periods. Patient reports she was able to walk 5 miles a day and unable to complete even 2-3 walks. Patient reports unable to wear anything but tennis shoes. Patient reports once she is up and standing its not a big problem, can stand for a little while. Patient reports that she has had some buckling of he leg, does not normally happen. Patient reports they have brought up surgery but she wants to do some conservative care.     Falls: uneven surfaces 2-4 falls  within past year     Imaging, MRI studies:   Impression (10/2022):  1. Complex tear of lateral meniscus.  2. Patellofemoral and lateral tibiofemoral chondral loss.  3. Moderate volume joint effusion.    Prior Therapy: N/A  Social History: lives alone  Occupation: Retired   Prior Level of Function: walking 5 miles, no increase pain with knee movements, driving, bending/lifting and sleeping with no pain   Current Level of Function: decreased WEIGHT BEARING tolerance standing/walking, decreased ability to squat/bend/lift    Pain:  Current 5/10, worst 9/10, best 0/10   Location: right knee  (anterior and posterior aspect, very generalized)  Description: Aching, Dull, Throbbing, and Sharp  Aggravating Factors: Sitting, Bending, Walking, Morning, and Getting out of bed/chair  Easing Factors: ice, pain medication, knee brace     Patients goals: pain-free, active lifestyle (has gained weight), walking 2-3 miles      Medical History:   Past Medical History:   Diagnosis Date    Anemia     Anxiety     Depression     Diabetes mellitus, type 2     Migraine        Surgical History:   Toya Holbrook  has a past surgical history that includes Cholecystectomy;  section; Cosmetic surgery; Hysterectomy; Adenoidectomy (); Eye surgery (); Tonsillectomy (); Laparoscopic gastric banding (); and Oophorectomy.    Medications:   Toya has a current medication list which includes the following prescription(s): alprazolam, cyanocobalamin, epinephrine, meloxicam, sertraline, tramadol, and varicella-zoster ge-as01b (pf), and the following Facility-Administered Medications: cyanocobalamin and hylan g-f 20.    Allergies:   Review of patient's allergies indicates:  No Known Allergies       OBJECTIVE     Observation: difficulty with SIT TO STAND, use of HHA, antalgic gait pattern. Decreased WEIGHT BEARING in static stance on RIGHT LOWER EXTREMITY (~10 degree right knee flexion)      Functional tests:   SL squat: NT due  to decreased WEIGHT BEARING tolerance on RIGHT LOWER EXTREMITY   SLS EO: NT due to decreased WEIGHT BEARING tolerance on RIGHT LOWER EXTREMITY   SLS EC: NT due to decreased WEIGHT BEARING tolerance on RIGHT LOWER EXTREMITY     Range of Motion (Passive):   Knee Right  Left    Flexion 135 148   Extension 3 pain -3     Range of Motion (Active):   Knee Right  Left    Flexion 122 pain  145   Extension 10 pain -2       Lower Extremity Strength  Right LE  Left LE    Quadriceps: 4+/5 Quadriceps: 5/5   Hamstrings: 4-/5 Hamstrings: 5/5   Hip flexion (seated): 4+/5 Hip flexion (seated): 5/5   Hip extension:  4/5 Hip extension: 4+/5   PGM: 4/5 PGM:  4+/5   Hip ER:  4/5 Hip ER:  4+/5   Hip IR: 4/5 Hip IR: 4+/5     Special Tests:   Right Left   Valgus Stress Test - -   Varus Stress test - -   Lachman's test - -   Posterior Lachman NT NT   Asia's Test + -   Thessaly's Test NT NT   Patellar Grind Test NT NT     Joint Mobility: WNL    Palpation: mod tissue reactivity to pes anserine, quad musculature     Sensation: WNL    Flexibility:    Hamstrings: R = + ; L = WITHIN NORMAL LIMIT    Ricardo test: R = + ; L = WITHIN NORMAL LIMIT  -Difficulty testing due to decreased tolerance to knee extension or flexion     Edema: mild increased swelling circumferential at knee joint and proximal         Limitation/Restriction for FOTO 1/3 Survey    Therapist reviewed FOTO scores for Toya Holbrook on 2/9/2023.   FOTO documents entered into [employer] - see Media section.    Limitation Score: 52% (limitation)         TREATMENT     Total Treatment time (time-based codes) separate from Evaluation: 25 minutes      Toya received the treatments listed below:      therapeutic exercises to develop strength, endurance, ROM, and flexibility for 25 minutes including:  Seated marches 1 x 10   SHORT ARC QUAD 1 x 10 5 sec holds       PATIENT EDUCATION AND HOME EXERCISES     Education provided:   - POC, HOME EXERCISE PROGRAM, sleeping positioning    Written  Home Exercises Provided: yes. Exercises were reviewed and Toya was able to demonstrate them prior to the end of the session.  Toya demonstrated good  understanding of the education provided. See EMR under Patient Instructions for exercises provided during therapy sessions.    ASSESSMENT     Toya is a 52 y.o. female referred to outpatient Physical Therapy with a medical diagnosis of S83.281A (ICD-10-CM) - Acute tear lateral meniscus, right, initial encounter M17.11 (ICD-10-CM) - Primary osteoarthritis of right knee. Patient presents with signs and symptoms consistent with right meniscus tear with decreased tolerance for weight bearing activities and significant pain/decreased range of motion into knee flexion and extension. Patient demonstrates decreased ROM, flexibility, strength, neuromuscular control, proprioception, and decreased balance limiting ability to perform daily activities and participate in normal fitness routine due to increased right knee pain.     Patient prognosis is Good.   Patient will benefit from skilled outpatient Physical Therapy to address the deficits stated above and in the chart below, provide patient /family education, and to maximize patientt's level of independence.     Plan of care discussed with patient: Yes  Patient's spiritual, cultural and educational needs considered and patient is agreeable to the plan of care and goals as stated below:     Anticipated Barriers for therapy: none    Medical Necessity is demonstrated by the following  History  Co-morbidities and personal factors that may impact the plan of care Co-morbidities:   anxiety, depression, and diabetes    Personal Factors:   coping style     moderate   Examination  Body Structures and Functions, activity limitations and participation restrictions that may impact the plan of care Body Regions:   lower extremities    Body Systems:    gross symmetry  ROM  strength  gross coordinated movement  balance  gait  motor  control  edema    Participation Restrictions:   Inability to perform walking tasks, fitness routine    Activity limitations:   Learning and applying knowledge  no deficits    General Tasks and Commands  no deficits    Communication  no deficits    Mobility  lifting and carrying objects  walking    Self care  no deficits    Domestic Life  shopping  cooking  doing house work (cleaning house, washing dishes, laundry)    Interactions/Relationships  no deficits    Life Areas  no deficits    Community and Social Life  no deficits         moderate   Clinical Presentation evolving clinical presentation with changing clinical characteristics moderate   Decision Making/ Complexity Score: moderate     GOALS: Short Term Goals:  6 weeks  1.Report decreased right knee pain  < / =  3/10  to increase tolerance for walking and bending/squatting tasks   2. Increase knee ROM to 0 degrees extension in order to be able to perform ADLs without difficulty.  3. Increase strength by 1/3 MMT grade in right gluteal and hip stability strength  to increase tolerance for ADL and work activities.  4. Pt to tolerate HEP to improve ROM and independence with ADL's    Long Term Goals: 12 weeks  1.Report decreased right knee pain < / = 1/10  to increase tolerance for walking and bending/squatting tasks   2.Patient goal: ambulate 2 miles with decreased compensations with no rest breaks   3.Increase strength to 5/5 in right knee quadricep musculature  to increase tolerance for ADL and work activities.  4. Pt will report at 52% limitation score on FOTO knee to demonstrate increase in LE function with every day tasks.      PLAN   Plan of care Certification: 2/9/2023 to 5/4/2023.    Outpatient Physical Therapy 2 times weekly for 12 weeks to include the following interventions: Electrical Stimulation , Gait Training, Manual Therapy, Moist Heat/ Ice, Neuromuscular Re-ed, Patient Education, Self Care, Therapeutic Activities, and Therapeutic Exercise.     Julia  SANYA Whitley      I CERTIFY THE NEED FOR THESE SERVICES FURNISHED UNDER THIS PLAN OF TREATMENT AND WHILE UNDER MY CARE   Physician's comments:     Physician's Signature: ___________________________________________________

## 2023-03-21 RX ORDER — SERTRALINE HYDROCHLORIDE 50 MG/1
50 TABLET, FILM COATED ORAL DAILY
Qty: 90 TABLET | Refills: 0 | Status: SHIPPED | OUTPATIENT
Start: 2023-03-21 | End: 2023-09-02 | Stop reason: SDUPTHER

## 2023-03-21 RX ORDER — ALPRAZOLAM 1 MG/1
1 TABLET ORAL NIGHTLY PRN
Qty: 30 TABLET | Refills: 0 | Status: SHIPPED | OUTPATIENT
Start: 2023-03-21 | End: 2023-05-25 | Stop reason: SDUPTHER

## 2023-03-21 NOTE — TELEPHONE ENCOUNTER
No new care gaps identified.  [facility] Embedded Care Gaps. Reference number: [id number]. 3/20/2023   10:23:04 PM CDT

## 2023-03-27 ENCOUNTER — LAB VISIT (OUTPATIENT)
Dept: LAB | Facility: HOSPITAL | Age: 53
End: 2023-03-27
Attending: INTERNAL MEDICINE
Payer: COMMERCIAL

## 2023-03-27 DIAGNOSIS — Z00.00 ANNUAL PHYSICAL EXAM: ICD-10-CM

## 2023-03-27 LAB
ALBUMIN SERPL BCP-MCNC: 4 G/DL (ref 3.5–5.2)
ALP SERPL-CCNC: 50 U/L (ref 55–135)
ALT SERPL W/O P-5'-P-CCNC: 16 U/L (ref 10–44)
ANION GAP SERPL CALC-SCNC: 10 MMOL/L (ref 8–16)
AST SERPL-CCNC: 14 U/L (ref 10–40)
BASOPHILS # BLD AUTO: 0.08 K/UL (ref 0–0.2)
BASOPHILS NFR BLD: 1 % (ref 0–1.9)
BILIRUB SERPL-MCNC: 0.3 MG/DL (ref 0.1–1)
BUN SERPL-MCNC: 11 MG/DL (ref 6–20)
CALCIUM SERPL-MCNC: 9.3 MG/DL (ref 8.7–10.5)
CHLORIDE SERPL-SCNC: 105 MMOL/L (ref 95–110)
CHOLEST SERPL-MCNC: 196 MG/DL (ref 120–199)
CHOLEST/HDLC SERPL: 3.2 {RATIO} (ref 2–5)
CO2 SERPL-SCNC: 27 MMOL/L (ref 23–29)
CREAT SERPL-MCNC: 0.7 MG/DL (ref 0.5–1.4)
DIFFERENTIAL METHOD: ABNORMAL
EOSINOPHIL # BLD AUTO: 0.5 K/UL (ref 0–0.5)
EOSINOPHIL NFR BLD: 6.3 % (ref 0–8)
ERYTHROCYTE [DISTWIDTH] IN BLOOD BY AUTOMATED COUNT: 13.7 % (ref 11.5–14.5)
EST. GFR  (NO RACE VARIABLE): >60 ML/MIN/1.73 M^2
ESTIMATED AVG GLUCOSE: 117 MG/DL (ref 68–131)
GLUCOSE SERPL-MCNC: 104 MG/DL (ref 70–110)
HBA1C MFR BLD: 5.7 % (ref 4–5.6)
HCT VFR BLD AUTO: 43.3 % (ref 37–48.5)
HDLC SERPL-MCNC: 62 MG/DL (ref 40–75)
HDLC SERPL: 31.6 % (ref 20–50)
HGB BLD-MCNC: 13.6 G/DL (ref 12–16)
IMM GRANULOCYTES # BLD AUTO: 0.02 K/UL (ref 0–0.04)
IMM GRANULOCYTES NFR BLD AUTO: 0.3 % (ref 0–0.5)
LDLC SERPL CALC-MCNC: 111.8 MG/DL (ref 63–159)
LYMPHOCYTES # BLD AUTO: 3.1 K/UL (ref 1–4.8)
LYMPHOCYTES NFR BLD: 39.8 % (ref 18–48)
MCH RBC QN AUTO: 29.7 PG (ref 27–31)
MCHC RBC AUTO-ENTMCNC: 31.4 G/DL (ref 32–36)
MCV RBC AUTO: 95 FL (ref 82–98)
MONOCYTES # BLD AUTO: 0.5 K/UL (ref 0.3–1)
MONOCYTES NFR BLD: 6 % (ref 4–15)
NEUTROPHILS # BLD AUTO: 3.7 K/UL (ref 1.8–7.7)
NEUTROPHILS NFR BLD: 46.6 % (ref 38–73)
NONHDLC SERPL-MCNC: 134 MG/DL
NRBC BLD-RTO: 0 /100 WBC
PLATELET # BLD AUTO: 307 K/UL (ref 150–450)
PMV BLD AUTO: 10 FL (ref 9.2–12.9)
POTASSIUM SERPL-SCNC: 4.7 MMOL/L (ref 3.5–5.1)
PROT SERPL-MCNC: 6.9 G/DL (ref 6–8.4)
RBC # BLD AUTO: 4.58 M/UL (ref 4–5.4)
SODIUM SERPL-SCNC: 142 MMOL/L (ref 136–145)
TRIGL SERPL-MCNC: 111 MG/DL (ref 30–150)
TSH SERPL DL<=0.005 MIU/L-ACNC: 1.54 UIU/ML (ref 0.4–4)
WBC # BLD AUTO: 7.82 K/UL (ref 3.9–12.7)

## 2023-03-27 PROCEDURE — 83036 HEMOGLOBIN GLYCOSYLATED A1C: CPT | Performed by: INTERNAL MEDICINE

## 2023-03-27 PROCEDURE — 84443 ASSAY THYROID STIM HORMONE: CPT | Performed by: INTERNAL MEDICINE

## 2023-03-27 PROCEDURE — 80053 COMPREHEN METABOLIC PANEL: CPT | Performed by: INTERNAL MEDICINE

## 2023-03-27 PROCEDURE — 80061 LIPID PANEL: CPT | Performed by: INTERNAL MEDICINE

## 2023-03-27 PROCEDURE — 85025 COMPLETE CBC W/AUTO DIFF WBC: CPT | Performed by: INTERNAL MEDICINE

## 2023-03-27 PROCEDURE — 36415 COLL VENOUS BLD VENIPUNCTURE: CPT | Performed by: INTERNAL MEDICINE

## 2023-03-31 ENCOUNTER — TELEPHONE (OUTPATIENT)
Dept: INTERNAL MEDICINE | Facility: CLINIC | Age: 53
End: 2023-03-31
Payer: COMMERCIAL

## 2023-05-11 ENCOUNTER — CLINICAL SUPPORT (OUTPATIENT)
Dept: ENDOSCOPY | Facility: HOSPITAL | Age: 53
End: 2023-05-11
Payer: COMMERCIAL

## 2023-05-11 VITALS — WEIGHT: 210 LBS | BODY MASS INDEX: 31.83 KG/M2 | HEIGHT: 68 IN

## 2023-05-11 DIAGNOSIS — R10.9 ABDOMINAL PAIN, UNSPECIFIED ABDOMINAL LOCATION: ICD-10-CM

## 2023-05-11 DIAGNOSIS — K92.2 GASTROINTESTINAL HEMORRHAGE, UNSPECIFIED GASTROINTESTINAL HEMORRHAGE TYPE: ICD-10-CM

## 2023-05-11 DIAGNOSIS — Z12.11 SPECIAL SCREENING FOR MALIGNANT NEOPLASMS, COLON: Primary | ICD-10-CM

## 2023-05-11 RX ORDER — SODIUM, POTASSIUM,MAG SULFATES 17.5-3.13G
1 SOLUTION, RECONSTITUTED, ORAL ORAL DAILY
Qty: 1 KIT | Refills: 0 | Status: SHIPPED | OUTPATIENT
Start: 2023-05-11 | End: 2023-05-13

## 2023-05-15 ENCOUNTER — ANESTHESIA EVENT (OUTPATIENT)
Dept: ENDOSCOPY | Facility: HOSPITAL | Age: 53
End: 2023-05-15
Payer: COMMERCIAL

## 2023-05-15 NOTE — ANESTHESIA PREPROCEDURE EVALUATION
05/15/2023  Toya Holbrook is a 52 y.o., female.  [facility]  Anesthesia Pre-Operative Evaluation       Patient Name: Toya Holbrook  YOB: 1970  MRN: [id number]  [facility]: [id number]      Code Status: No Order   Date of Procedure: 5/17/2023  Anesthesia: Choice Procedure: Procedure(s) (LRB):  COLONOSCOPY (N/A)  Pre-Operative Diagnosis: Abdominal pain, unspecified abdominal location [R10.9]  Gastrointestinal hemorrhage, unspecified gastrointestinal hemorrhage type [K92.2]  Proceduralist: Surgeon(s) and Role:     * Pierce Silva MD - Primary Nurse: (Unknown)      SUBJECTIVE:   Toya Holbrook is a 52 y.o. female who  has a past medical history of Anemia, Anxiety, Depression, Diabetes mellitus, type 2, and Migraine..     she has a current medication list which includes the following long-term medication(s): alprazolam, epinephrine, and sertraline.     ALLERGIES:   Review of patient's allergies indicates:  No Known Allergies  LDA:          Lines/Drains/Airways     None                Anesthesia Evaluation      Airway   Dental      Pulmonary    Cardiovascular     Neuro/Psych    (+) headaches, psychiatric history    GI/Hepatic/Renal      Endo/Other    (+) diabetes mellitus,   Abdominal                   MEDICATIONS:     Antibiotics (From admission, onward)    None        VTE Risk Mitigation (From admission, onward)    None            Current Facility-Administered Medications   Medication Dose Route Frequency Provider Last Rate Last Admin    cyanocobalamin injection 1,000 mcg  1,000 mcg Intramuscular Q30 Days Cris Peralta NP   1,000 mcg at 02/01/23 1455    hylan g-f 20 (SYNVISC ONE) 48 mg/6 mL injection 48 mg  48 mg Intra-articular 1 time in Clinic/HOD Debby Lin PA-C         Current Outpatient Medications   Medication Sig Dispense Refill    ALPRAZolam  (XANAX) 1 MG tablet Take 1 tablet (1 mg total) by mouth nightly as needed for Anxiety. 30 tablet 0    cyanocobalamin (VITAMIN B-12) 1000 MCG tablet Take 1 tablet (1,000 mcg total) by mouth once daily. 30 tablet 12    EPINEPHrine (EPIPEN) 0.3 mg/0.3 mL AtIn SMARTSI Pre-Filled Pen Syringe IM Once      meloxicam (MOBIC) 15 MG tablet Take 1 tablet (15 mg total) by mouth once daily. 30 tablet 3    sertraline (ZOLOFT) 50 MG tablet Take 1 tablet (50 mg total) by mouth once daily. 90 tablet 0    traMADoL (ULTRAM) 50 mg tablet Take 1 tablet (50 mg total) by mouth every 6 (six) hours as needed for Pain. 20 tablet 0    varicella-zoster gE-AS01B, PF, (SHINGRIX) 50 mcg/0.5 mL injection Inject into the muscle. 1 each 0          History:   There are no hospital problems to display for this patient.    Surgical History:    has a past surgical history that includes Cholecystectomy;  section; Cosmetic surgery; Hysterectomy; Adenoidectomy (); Eye surgery (); Tonsillectomy (); Laparoscopic gastric banding (); and Oophorectomy.   Social History:    reports being sexually active and has had partner(s) who are male. She reports using the following method of birth control/protection: Condom.  reports that she has been smoking cigarettes. She has a 0.50 pack-year smoking history. She has never used smokeless tobacco. She reports current alcohol use of about 8.0 standard drinks per week. She reports current drug use. Frequency: 2.00 times per week. Drug: Marijuana.     OBJECTIVE:     Vital Signs (Most Recent):    Vital Signs Range (Last 24H):          There is no height or weight on file to calculate BMI.   Wt Readings from Last 4 Encounters:   23 95.3 kg (210 lb)   23 107.3 kg (236 lb 8.9 oz)   23 107.3 kg (236 lb 8.9 oz)   23 99.8 kg (220 lb 0.3 oz)       Significant Labs:  Lab Results   Component Value Date    WBC 7.82 2023    HGB 13.6 2023    HCT 43.3 2023    PLT  307 03/27/2023     03/27/2023    K 4.7 03/27/2023     03/27/2023    CREATININE 0.7 03/27/2023    BUN 11 03/27/2023    CO2 27 03/27/2023     03/27/2023    CALCIUM 9.3 03/27/2023    ALKPHOS 50 (L) 03/27/2023    ALT 16 03/27/2023    AST 14 03/27/2023    ALBUMIN 4.0 03/27/2023    HGBA1C 5.7 (H) 03/27/2023     No LMP recorded. Patient has had a hysterectomy.  No results found for this or any previous visit (from the past 72 hour(s)).    EKG:   No results found for this or any previous visit.    TTE:  No results found for this or any previous visit.  No results found for: EF   No results found for this or any previous visit.  DEE:  No results found for this or any previous visit.  Stress Test:  No results found for this or any previous visit.     LHC:  No results found for this or any previous visit.     PFT:  No results found for: FEV1, FVC, OYF6LMM, TLC, DLCO     ASSESSMENT/PLAN:       Pre-op Assessment    I have reviewed the Patient Summary Reports.     I have reviewed the Nursing Notes.    I have reviewed the Medications.     Review of Systems  Anesthesia Hx:  No problems with previous Anesthesia  Denies Family Hx of Anesthesia complications.   Denies Personal Hx of Anesthesia complications.   Hematology/Oncology:  Hematology Normal   Oncology Normal     EENT/Dental:EENT/Dental Normal   Cardiovascular:  Cardiovascular Normal     Pulmonary:  Pulmonary Normal    Renal/:  Renal/ Normal     Hepatic/GI:  Hepatic/GI Normal    Musculoskeletal:  Musculoskeletal Normal    Neurological:   Headaches    Endocrine:   Diabetes    Dermatological:  Skin Normal    Psych:   Psychiatric History depression             Anesthesia Plan  Type of Anesthesia, risks & benefits discussed:    Anesthesia Type: Gen Natural Airway  Intra-op Monitoring Plan: Standard ASA Monitors  Post Op Pain Control Plan: multimodal analgesia  Induction:  IV  Informed Consent: Informed consent signed with the Patient and all parties  understand the risks and agree with anesthesia plan.  All questions answered. Patient consented to blood products? No  ASA Score: 2    Ready For Surgery From Anesthesia Perspective.     .

## 2023-05-17 ENCOUNTER — HOSPITAL ENCOUNTER (OUTPATIENT)
Facility: HOSPITAL | Age: 53
Discharge: HOME | End: 2023-05-17
Attending: INTERNAL MEDICINE | Admitting: INTERNAL MEDICINE
Payer: COMMERCIAL

## 2023-05-17 ENCOUNTER — PATIENT MESSAGE (OUTPATIENT)
Dept: INTERNAL MEDICINE | Facility: CLINIC | Age: 53
End: 2023-05-17
Payer: COMMERCIAL

## 2023-05-17 ENCOUNTER — ANESTHESIA (OUTPATIENT)
Dept: ENDOSCOPY | Facility: HOSPITAL | Age: 53
End: 2023-05-17
Payer: COMMERCIAL

## 2023-05-17 VITALS
HEIGHT: 68 IN | TEMPERATURE: 98 F | BODY MASS INDEX: 31.83 KG/M2 | SYSTOLIC BLOOD PRESSURE: 122 MMHG | WEIGHT: 210 LBS | HEART RATE: 75 BPM | RESPIRATION RATE: 20 BRPM | OXYGEN SATURATION: 99 % | DIASTOLIC BLOOD PRESSURE: 67 MMHG

## 2023-05-17 DIAGNOSIS — Z80.0 FAMILY HISTORY OF COLON CANCER: Primary | ICD-10-CM

## 2023-05-17 DIAGNOSIS — Z12.11 SCREENING FOR COLON CANCER: ICD-10-CM

## 2023-05-17 PROCEDURE — D9220A PRA ANESTHESIA: Mod: 33,,, | Performed by: REGISTERED NURSE

## 2023-05-17 PROCEDURE — D9220A PRA ANESTHESIA: ICD-10-PCS | Mod: 33,,, | Performed by: REGISTERED NURSE

## 2023-05-17 PROCEDURE — 25000003 PHARM REV CODE 250: Performed by: REGISTERED NURSE

## 2023-05-17 PROCEDURE — 88305 TISSUE EXAM BY PATHOLOGIST: CPT | Performed by: PATHOLOGY

## 2023-05-17 PROCEDURE — 37000008 HC ANESTHESIA 1ST 15 MINUTES: Performed by: INTERNAL MEDICINE

## 2023-05-17 PROCEDURE — 94761 N-INVAS EAR/PLS OXIMETRY MLT: CPT

## 2023-05-17 PROCEDURE — 45385 PR COLONOSCOPY,REMV LESN,SNARE: ICD-10-PCS | Mod: 33,,, | Performed by: INTERNAL MEDICINE

## 2023-05-17 PROCEDURE — 88305 TISSUE EXAM BY PATHOLOGIST: ICD-10-PCS | Mod: 26,,, | Performed by: PATHOLOGY

## 2023-05-17 PROCEDURE — 45385 COLONOSCOPY W/LESION REMOVAL: CPT | Mod: PT | Performed by: INTERNAL MEDICINE

## 2023-05-17 PROCEDURE — 88305 TISSUE EXAM BY PATHOLOGIST: CPT | Mod: 26,,, | Performed by: PATHOLOGY

## 2023-05-17 PROCEDURE — 25000003 PHARM REV CODE 250: Performed by: INTERNAL MEDICINE

## 2023-05-17 PROCEDURE — 37000009 HC ANESTHESIA EA ADD 15 MINS: Performed by: INTERNAL MEDICINE

## 2023-05-17 PROCEDURE — 45385 COLONOSCOPY W/LESION REMOVAL: CPT | Mod: 33,,, | Performed by: INTERNAL MEDICINE

## 2023-05-17 PROCEDURE — 63600175 PHARM REV CODE 636 W HCPCS: Performed by: REGISTERED NURSE

## 2023-05-17 PROCEDURE — 99900035 HC TECH TIME PER 15 MIN (STAT)

## 2023-05-17 RX ORDER — LIDOCAINE HYDROCHLORIDE 20 MG/ML
INJECTION INTRAVENOUS
Status: DISCONTINUED | OUTPATIENT
Start: 2023-05-17 | End: 2023-05-17

## 2023-05-17 RX ORDER — SODIUM CHLORIDE 9 MG/ML
INJECTION, SOLUTION INTRAVENOUS CONTINUOUS
Status: DISCONTINUED | OUTPATIENT
Start: 2023-05-17 | End: 2023-05-17 | Stop reason: HOSPADM

## 2023-05-17 RX ORDER — PROPOFOL 10 MG/ML
VIAL (ML) INTRAVENOUS CONTINUOUS PRN
Status: DISCONTINUED | OUTPATIENT
Start: 2023-05-17 | End: 2023-05-17

## 2023-05-17 RX ORDER — PROPOFOL 10 MG/ML
VIAL (ML) INTRAVENOUS
Status: DISCONTINUED | OUTPATIENT
Start: 2023-05-17 | End: 2023-05-17

## 2023-05-17 RX ADMIN — Medication 175 MCG/KG/MIN: at 11:05

## 2023-05-17 RX ADMIN — SODIUM CHLORIDE: 0.9 INJECTION, SOLUTION INTRAVENOUS at 11:05

## 2023-05-17 RX ADMIN — PROPOFOL 70 MG: 10 INJECTION, EMULSION INTRAVENOUS at 11:05

## 2023-05-17 RX ADMIN — PROPOFOL 50 MG: 10 INJECTION, EMULSION INTRAVENOUS at 11:05

## 2023-05-17 RX ADMIN — LIDOCAINE HYDROCHLORIDE 40 MG: 20 INJECTION INTRAVENOUS at 11:05

## 2023-05-17 NOTE — PROVATION PATIENT INSTRUCTIONS
Discharge Summary/Instructions after an Endoscopic Procedure  Patient Name: Toya Holbrook  Patient MRN: [id number]  Patient YOB: 1970  Wednesday, May 17, 2023  Pierce Silva MD  Dear patient,  As a result of recent federal legislation (The Federal Cures Act), you may   receive lab or pathology results from your procedure in your [facility]   account before your physician is able to contact you. Your physician or   their representative will relay the results to you with their   recommendations at their soonest availability.  Thank you,  RESTRICTIONS:  During your procedure today, you received medications for sedation.  These   medications may affect your judgment, balance and coordination.  Therefore,   for 24 hours, you have the following restrictions:   - DO NOT drive a car, operate machinery, make legal/financial decisions,   sign important papers or drink alcohol.    ACTIVITY:  Today: no heavy lifting, straining or running due to procedural   sedation/anesthesia.  The following day: return to full activity including work.  DIET:  Eat and drink normally unless instructed otherwise.     TREATMENT FOR COMMON SIDE EFFECTS:  - Mild abdominal pain, nausea, belching, bloating or excessive gas:  rest,   eat lightly and use a heating pad.  - Sore Throat: treat with throat lozenges and/or gargle with warm salt   water.  - Because air was used during the procedure, expelling large amounts of air   from your rectum or belching is normal.  - If a bowel prep was taken, you may not have a bowel movement for 1-3 days.    This is normal.  SYMPTOMS TO WATCH FOR AND REPORT TO YOUR PHYSICIAN:  1. Abdominal pain or bloating, other than gas cramps.  2. Chest pain.  3. Back pain.  4. Signs of infection such as: chills or fever occurring within 24 hours   after the procedure.  5. Rectal bleeding, which would show as bright red, maroon, or black stools.   (A tablespoon of blood from the rectum is not serious,  especially if   hemorrhoids are present.)  6. Vomiting.  7. Weakness or dizziness.  GO DIRECTLY TO THE NEAREST EMERGENCY ROOM IF YOU HAVE ANY OF THE FOLLOWING:      Difficulty breathing              Chills and/or fever over 101 F   Persistent vomiting and/or vomiting blood   Severe abdominal pain   Severe chest pain   Black, tarry stools   Bleeding- more than one tablespoon   Any other symptom or condition that you feel may need urgent attention  Your doctor recommends these additional instructions:  If any biopsies were taken, your doctors clinic will contact you in 1 to 2   weeks with any results.  - Patient has a contact number available for emergencies.  The signs and   symptoms of potential delayed complications were discussed with the   patient.  Return to normal activities tomorrow.  Written discharge   instructions were provided to the patient.   - Discharge patient to home.   - Resume previous diet.   - Continue present medications.   - Await pathology results.   - Repeat colonoscopy in 5 years for surveillance.   For questions, problems or results please call your physician - Pierce Silva MD at Work:  [phone].  [facility], EMERGENCY ROOM PHONE NUMBER: [phone]  IF A COMPLICATION OR EMERGENCY SITUATION ARISES AND YOU ARE UNABLE TO REACH   YOUR PHYSICIAN - GO DIRECTLY TO THE EMERGENCY ROOM.  Pierce Silva MD  5/17/2023 11:44:14 AM  This report has been verified and signed electronically.  Dear patient,  As a result of recent federal legislation (The Federal Cures Act), you may   receive lab or pathology results from your procedure in your [facility]   account before your physician is able to contact you. Your physician or   their representative will relay the results to you with their   recommendations at their soonest availability.  Thank you,  PROVATION

## 2023-05-17 NOTE — H&P
Short Stay Endoscopy History and Physical    PCP - Jaclyn Callahan MD    Procedure - Colonoscopy  Sedation: GA  ASA - per anesthesia  Mallampati - per anesthesia  History of Anesthesia problems - no  Family history Anesthesia problems -  no     HPI:  This is a 52 y.o. female here for evaluation of : Brother with CRC    Reflux - no  Dysphagia - no  Abdominal pain - no  Diarrhea - no    ROS:  Constitutional: No fevers, chills, No weight loss  ENT: No allergies  CV: No chest pain  Pulm: No cough, No shortness of breath  Ophtho: No vision changes  GI: see HPI  Medical History:  has a past medical history of Anemia, Anxiety, Depression, Diabetes mellitus, type 2, and Migraine.    Surgical History:  has a past surgical history that includes Cholecystectomy;  section; Cosmetic surgery; Hysterectomy; Adenoidectomy (); Eye surgery (); Tonsillectomy (); Laparoscopic gastric banding (); and Oophorectomy.    Family History: family history includes Alcohol abuse in her father; Arthritis in her mother; Cancer in her mother and sister; Cancer (age of onset: 50) in her brother; Depression in her mother; Diabetes in her brother, maternal grandmother, mother, and paternal grandmother; Heart disease in her father; No Known Problems in her daughter and son; Thyroid disease in her father.. Otherwise no colon cancer, inflammatory bowel disease, or GI malignancies.    Social History:  reports that she has been smoking cigarettes. She has a 0.50 pack-year smoking history. She has never used smokeless tobacco. She reports current alcohol use of about 8.0 standard drinks per week. She reports current drug use. Frequency: 2.00 times per week. Drug: Marijuana.    Review of patient's allergies indicates:  No Known Allergies    Medications:   Facility-Administered Medications Prior to Admission   Medication Dose Route Frequency Provider Last Rate Last Admin    cyanocobalamin injection 1,000 mcg  1,000 mcg Intramuscular  Q30 Days Cris Peralta, NP   1,000 mcg at 23 1455    hylan g-f 20 (SYNVISC ONE) 48 mg/6 mL injection 48 mg  48 mg Intra-articular 1 time in Clinic/HOD Debby Lin PA-C         Medications Prior to Admission   Medication Sig Dispense Refill Last Dose    ALPRAZolam (XANAX) 1 MG tablet Take 1 tablet (1 mg total) by mouth nightly as needed for Anxiety. 30 tablet 0     cyanocobalamin (VITAMIN B-12) 1000 MCG tablet Take 1 tablet (1,000 mcg total) by mouth once daily. 30 tablet 12     EPINEPHrine (EPIPEN) 0.3 mg/0.3 mL AtIn SMARTSI Pre-Filled Pen Syringe IM Once       meloxicam (MOBIC) 15 MG tablet Take 1 tablet (15 mg total) by mouth once daily. 30 tablet 3     sertraline (ZOLOFT) 50 MG tablet Take 1 tablet (50 mg total) by mouth once daily. 90 tablet 0     traMADoL (ULTRAM) 50 mg tablet Take 1 tablet (50 mg total) by mouth every 6 (six) hours as needed for Pain. 20 tablet 0     varicella-zoster gE-AS01B, PF, (SHINGRIX) 50 mcg/0.5 mL injection Inject into the muscle. 1 each 0        Objective Findings:    Vital Signs: Per nursing notes.    Physical Exam:  General Appearance: Well appearing in no acute distress  Head:   Normocephalic, without obvious abnormality  Eyes:    No scleral icterus  Airway: Open  Neck: No restriction in mobility  Lungs: CTA bilaterally in anterior and posterior fields, no wheezes, no crackles.  Heart:  Regular rate and rhythm, S1, S2 normal, no murmurs heard  Abdomen: Soft, non tender, non distended      Labs:  Lab Results   Component Value Date    WBC 7.82 2023    HGB 13.6 2023    HCT 43.3 2023     2023    CHOL 196 2023    TRIG 111 2023    HDL 62 2023    ALT 16 2023    AST 14 2023     2023    K 4.7 2023     2023    CREATININE 0.7 2023    BUN 11 2023    CO2 27 2023    TSH 1.542 2023    HGBA1C 5.7 (H) 2023         I have explained the risks and benefits of  endoscopy procedures to the patient including but not limited to bleeding, perforation, infection, and death.    Thank you so much for allowing me to participate in the care of Toya Silva MD

## 2023-05-17 NOTE — TRANSFER OF CARE
"Anesthesia Transfer of Care Note    Patient: Toya Holbrook    Procedure(s) Performed: Procedure(s) (LRB):  COLONOSCOPY (N/A)    Patient location: PACU    Anesthesia Type: general    Transport from OR: Transported from OR on room air with adequate spontaneous ventilation    Post pain: adequate analgesia    Post assessment: no apparent anesthetic complications and tolerated procedure well    Post vital signs: stable    Level of consciousness: awake, alert and oriented    Nausea/Vomiting: no nausea/vomiting    Complications: none    Transfer of care protocol was followed      Last vitals:   Visit Vitals  BP (!) 158/79 (BP Location: Left arm, Patient Position: Lying)   Pulse 77   Temp 36.8 °C (98.2 °F) (Temporal)   Resp 16   Ht 5' 8" (1.727 m)   Wt 95.3 kg (210 lb)   SpO2 97%   Breastfeeding No   BMI 31.93 kg/m²     "

## 2023-05-17 NOTE — ANESTHESIA POSTPROCEDURE EVALUATION
Anesthesia Post Evaluation    Patient: Toya Holbrook    Procedure(s) Performed: Procedure(s) (LRB):  COLONOSCOPY (N/A)    Final Anesthesia Type: general      Patient location during evaluation: PACU  Patient participation: Yes- Able to Participate  Level of consciousness: awake and alert  Post-procedure vital signs: reviewed and stable  Pain management: adequate  Airway patency: patent    PONV status at discharge: No PONV  Anesthetic complications: no      Cardiovascular status: blood pressure returned to baseline  Respiratory status: unassisted  Hydration status: euvolemic            Vitals Value Taken Time   /62 05/17/23 1151   Temp 36.8 05/17/23 1155   Pulse 91 05/17/23 1155   Resp 48 05/17/23 1155   SpO2 95 % 05/17/23 1155   Vitals shown include unvalidated device data.      No case tracking events are documented in the log.      Pain/Helena Score: No data recorded

## 2023-05-17 NOTE — PLAN OF CARE
Pt AAOx3, VSS on room air.Pt tolerated procedure well. Pt denies any pain, Dizziness or N/V. IV removed with catheter tip intact. Assisted up for the first time, steady on feet. Pt Discharge instructions given and explained to patient with verbalization of understanding all instructions. Pt denies any further questions at this time. Pt DC'd home VIA wheelchair with  friend.

## 2023-05-18 NOTE — TELEPHONE ENCOUNTER
Okay to send her my [facility] message, I will talk to her about her studies when she comes in for her office visit next week

## 2023-05-23 LAB
FINAL PATHOLOGIC DIAGNOSIS: NORMAL
GROSS: NORMAL
Lab: NORMAL

## 2023-05-25 ENCOUNTER — OFFICE VISIT (OUTPATIENT)
Dept: INTERNAL MEDICINE | Facility: CLINIC | Age: 53
End: 2023-05-25
Payer: COMMERCIAL

## 2023-05-25 VITALS
HEART RATE: 109 BPM | HEIGHT: 68 IN | WEIGHT: 222.44 LBS | OXYGEN SATURATION: 97 % | DIASTOLIC BLOOD PRESSURE: 90 MMHG | BODY MASS INDEX: 33.71 KG/M2 | SYSTOLIC BLOOD PRESSURE: 120 MMHG

## 2023-05-25 DIAGNOSIS — R26.2 DIFFICULTY WALKING: ICD-10-CM

## 2023-05-25 DIAGNOSIS — G89.29 CHRONIC PAIN OF RIGHT KNEE: ICD-10-CM

## 2023-05-25 DIAGNOSIS — R73.03 PREDIABETES: Primary | ICD-10-CM

## 2023-05-25 DIAGNOSIS — R29.898 DECREASED STRENGTH OF LOWER EXTREMITY: ICD-10-CM

## 2023-05-25 DIAGNOSIS — M25.561 CHRONIC PAIN OF RIGHT KNEE: ICD-10-CM

## 2023-05-25 DIAGNOSIS — M25.569 KNEE PAIN, UNSPECIFIED CHRONICITY, UNSPECIFIED LATERALITY: ICD-10-CM

## 2023-05-25 DIAGNOSIS — F41.9 ANXIETY: ICD-10-CM

## 2023-05-25 DIAGNOSIS — F32.9 MAJOR DEPRESSION, CHRONIC: ICD-10-CM

## 2023-05-25 PROCEDURE — 3074F PR MOST RECENT SYSTOLIC BLOOD PRESSURE < 130 MM HG: ICD-10-PCS | Mod: CPTII,S$GLB,, | Performed by: NURSE PRACTITIONER

## 2023-05-25 PROCEDURE — 99999 PR PBB SHADOW E&M-EST. PATIENT-LVL IV: CPT | Mod: PBBFAC,,, | Performed by: NURSE PRACTITIONER

## 2023-05-25 PROCEDURE — 99214 OFFICE O/P EST MOD 30 MIN: CPT | Mod: S$GLB,,, | Performed by: NURSE PRACTITIONER

## 2023-05-25 PROCEDURE — 99999 PR PBB SHADOW E&M-EST. PATIENT-LVL IV: ICD-10-PCS | Mod: PBBFAC,,, | Performed by: NURSE PRACTITIONER

## 2023-05-25 PROCEDURE — 3008F BODY MASS INDEX DOCD: CPT | Mod: CPTII,S$GLB,, | Performed by: NURSE PRACTITIONER

## 2023-05-25 PROCEDURE — 3074F SYST BP LT 130 MM HG: CPT | Mod: CPTII,S$GLB,, | Performed by: NURSE PRACTITIONER

## 2023-05-25 PROCEDURE — 3080F PR MOST RECENT DIASTOLIC BLOOD PRESSURE >= 90 MM HG: ICD-10-PCS | Mod: CPTII,S$GLB,, | Performed by: NURSE PRACTITIONER

## 2023-05-25 PROCEDURE — 1159F PR MEDICATION LIST DOCUMENTED IN MEDICAL RECORD: ICD-10-PCS | Mod: CPTII,S$GLB,, | Performed by: NURSE PRACTITIONER

## 2023-05-25 PROCEDURE — 3044F PR MOST RECENT HEMOGLOBIN A1C LEVEL <7.0%: ICD-10-PCS | Mod: CPTII,S$GLB,, | Performed by: NURSE PRACTITIONER

## 2023-05-25 PROCEDURE — 3008F PR BODY MASS INDEX (BMI) DOCUMENTED: ICD-10-PCS | Mod: CPTII,S$GLB,, | Performed by: NURSE PRACTITIONER

## 2023-05-25 PROCEDURE — 3044F HG A1C LEVEL LT 7.0%: CPT | Mod: CPTII,S$GLB,, | Performed by: NURSE PRACTITIONER

## 2023-05-25 PROCEDURE — 1159F MED LIST DOCD IN RCRD: CPT | Mod: CPTII,S$GLB,, | Performed by: NURSE PRACTITIONER

## 2023-05-25 PROCEDURE — 99214 PR OFFICE/OUTPT VISIT, EST, LEVL IV, 30-39 MIN: ICD-10-PCS | Mod: S$GLB,,, | Performed by: NURSE PRACTITIONER

## 2023-05-25 PROCEDURE — 3080F DIAST BP >= 90 MM HG: CPT | Mod: CPTII,S$GLB,, | Performed by: NURSE PRACTITIONER

## 2023-05-25 RX ORDER — MELOXICAM 15 MG/1
15 TABLET ORAL DAILY
Qty: 90 TABLET | Refills: 1 | Status: SHIPPED | OUTPATIENT
Start: 2023-05-25 | End: 2023-09-02 | Stop reason: SDUPTHER

## 2023-05-25 RX ORDER — ALPRAZOLAM 1 MG/1
1 TABLET ORAL NIGHTLY PRN
Qty: 30 TABLET | Refills: 0 | Status: SHIPPED | OUTPATIENT
Start: 2023-05-25 | End: 2023-09-02 | Stop reason: SDUPTHER

## 2023-05-25 RX ORDER — FLUTICASONE PROPIONATE 50 MCG
1 SPRAY, SUSPENSION (ML) NASAL DAILY
Qty: 16 G | Refills: 3 | Status: SHIPPED | OUTPATIENT
Start: 2023-05-25 | End: 2024-02-05 | Stop reason: SDUPTHER

## 2023-05-25 NOTE — PROGRESS NOTES
INTERNAL MEDICINE PROGRESS NOTE    CHIEF COMPLAINT     Chief Complaint   Patient presents with    Follow-up     Colonoscopy/diabetes     Fall     Yesterday        HPI     Toya Holbrook is a 52 y.o. female with anemia, anxiety, depression and DM2 who presents for a follow up visit today.    Here for 3 month follow up     Pre-DM - controlled with diet in past - was on metformin in  but could not tolerate 2/2 diarrhea and loose stools   Lab Results   Component Value Date    HGBA1C 5.7 (H) 2023       B12 deficiency- taking 1000mcg by mouth   B12 428     Lipids- controlled with diet     Depression/anxiety- taking zoloft 50mg     Knee pain - taking meloxicam     HM-  C-scope- 2023 2 tubular adenomas - repeat in 5 years             Past Medical History:  Past Medical History:   Diagnosis Date    Anemia     Anxiety     Depression     Diabetes mellitus, type 2     Migraine        Home Medications:  Prior to Admission medications    Medication Sig Start Date End Date Taking? Authorizing Provider   ALPRAZolam (XANAX) 1 MG tablet Take 1 tablet (1 mg total) by mouth nightly as needed for Anxiety. 3/21/23   Jaclyn Callahan MD   cyanocobalamin (VITAMIN B-12) 1000 MCG tablet Take 1 tablet (1,000 mcg total) by mouth once daily. 23   Jaclyn Callahan MD   EPINEPHrine (EPIPEN) 0.3 mg/0.3 mL AtIn SMARTSI Pre-Filled Pen Syringe IM Once 21   Historical Provider   meloxicam (MOBIC) 15 MG tablet Take 1 tablet (15 mg total) by mouth once daily. 23   Cris Peralta NP   sertraline (ZOLOFT) 50 MG tablet Take 1 tablet (50 mg total) by mouth once daily. 3/21/23   Jaclyn Callahan MD   traMADoL (ULTRAM) 50 mg tablet Take 1 tablet (50 mg total) by mouth every 6 (six) hours as needed for Pain. 11/3/22   Debby Lin PA-C   varicella-zoster gE-AS01B, PF, (SHINGRIX) 50 mcg/0.5 mL injection Inject into the muscle. 23   Jesica Dillon, PharmJOHNATHAN       Review of Systems:  Review of Systems  "  Constitutional:  Positive for activity change. Negative for unexpected weight change.   HENT:  Negative for hearing loss, rhinorrhea and trouble swallowing.    Eyes:  Negative for discharge and visual disturbance.   Respiratory:  Negative for chest tightness and wheezing.    Cardiovascular:  Negative for chest pain and palpitations.   Gastrointestinal:  Negative for blood in stool, constipation, diarrhea and vomiting.   Endocrine: Negative for polydipsia and polyuria.   Genitourinary:  Negative for difficulty urinating, dysuria, hematuria and menstrual problem.   Musculoskeletal:  Positive for arthralgias and joint swelling. Negative for neck pain.   Neurological:  Negative for weakness and headaches.   Psychiatric/Behavioral:  Positive for dysphoric mood. Negative for confusion.      Health Maintainence:   Immunizations:  Health Maintenance         Date Due Completion Date    Mammogram 01/30/2024 1/30/2023    Hemoglobin A1c (Diabetic Prevention Screening) 03/27/2026 3/27/2023    Lipid Panel 03/27/2028 3/27/2023    Colorectal Cancer Screening 05/17/2028 5/17/2023    TETANUS VACCINE 02/01/2033 2/1/2023             PHYSICAL EXAM     BP (!) 120/90 (BP Location: Left arm, Patient Position: Sitting, BP Method: Medium (Manual))   Pulse 109   Ht 5' 8" (1.727 m)   Wt 100.9 kg (222 lb 7.1 oz)   SpO2 97%   BMI 33.82 kg/m²     Physical Exam  Constitutional:       Appearance: She is well-developed.   HENT:      Head: Normocephalic and atraumatic.   Eyes:      Pupils: Pupils are equal, round, and reactive to light.   Cardiovascular:      Rate and Rhythm: Normal rate and regular rhythm.   Pulmonary:      Effort: Pulmonary effort is normal.   Musculoskeletal:      Right knee: Swelling and bony tenderness present. Tenderness present.        Legs:    Neurological:      Mental Status: She is alert and oriented to person, place, and time.       LABS     Lab Results   Component Value Date    HGBA1C 5.7 (H) 03/27/2023 "     CMP  Sodium   Date Value Ref Range Status   03/27/2023 142 136 - 145 mmol/L Final     Potassium   Date Value Ref Range Status   03/27/2023 4.7 3.5 - 5.1 mmol/L Final     Chloride   Date Value Ref Range Status   03/27/2023 105 95 - 110 mmol/L Final     CO2   Date Value Ref Range Status   03/27/2023 27 23 - 29 mmol/L Final     Glucose   Date Value Ref Range Status   03/27/2023 104 70 - 110 mg/dL Final     BUN   Date Value Ref Range Status   03/27/2023 11 6 - 20 mg/dL Final     Creatinine   Date Value Ref Range Status   03/27/2023 0.7 0.5 - 1.4 mg/dL Final     Calcium   Date Value Ref Range Status   03/27/2023 9.3 8.7 - 10.5 mg/dL Final     Total Protein   Date Value Ref Range Status   03/27/2023 6.9 6.0 - 8.4 g/dL Final     Albumin   Date Value Ref Range Status   03/27/2023 4.0 3.5 - 5.2 g/dL Final     Total Bilirubin   Date Value Ref Range Status   03/27/2023 0.3 0.1 - 1.0 mg/dL Final     Comment:     For infants and newborns, interpretation of results should be based  on gestational age, weight and in agreement with clinical  observations.    Premature Infant recommended reference ranges:  Up to 24 hours.............<8.0 mg/dL  Up to 48 hours............<12.0 mg/dL  3-5 days..................<15.0 mg/dL  6-29 days.................<15.0 mg/dL       Alkaline Phosphatase   Date Value Ref Range Status   03/27/2023 50 (L) 55 - 135 U/L Final     AST   Date Value Ref Range Status   03/27/2023 14 10 - 40 U/L Final     ALT   Date Value Ref Range Status   03/27/2023 16 10 - 44 U/L Final     Anion Gap   Date Value Ref Range Status   03/27/2023 10 8 - 16 mmol/L Final     Lab Results   Component Value Date    WBC 7.82 03/27/2023    HGB 13.6 03/27/2023    HCT 43.3 03/27/2023    MCV 95 03/27/2023     03/27/2023     Lab Results   Component Value Date    CHOL 196 03/27/2023    CHOL 177 01/30/2023    CHOL 181 10/20/2022     Lab Results   Component Value Date    HDL 62 03/27/2023    HDL 61 01/30/2023    HDL 60 10/20/2022      Lab Results   Component Value Date    LDLCALC 111.8 03/27/2023    LDLCALC 99.2 01/30/2023    LDLCALC 92.6 10/20/2022     Lab Results   Component Value Date    TRIG 111 03/27/2023    TRIG 84 01/30/2023    TRIG 142 10/20/2022     Lab Results   Component Value Date    CHOLHDL 31.6 03/27/2023    CHOLHDL 34.5 01/30/2023    CHOLHDL 33.1 10/20/2022     Lab Results   Component Value Date    TSH 1.542 03/27/2023       ASSESSMENT/PLAN     Toya Holbrook is a 52 y.o. female     Prediabetes- discussed dietary changes to improve A1c. Did not tolerate metformin in past, will monitor A1c   -     Cancel: Hemoglobin A1C; Future; Expected date: 05/25/2023  -     Hemoglobin A1C; Future; Expected date: 06/25/2023    Anxiety- stable. Will cont as needed   -     ALPRAZolam (XANAX) 1 MG tablet; Take 1 tablet (1 mg total) by mouth nightly as needed for Anxiety.  Dispense: 30 tablet; Refill: 0    Major depression, chronic- stable. Will cont current meds.     Chronic pain of right knee- f/u with ortho and cont use of brace.     Difficulty walking- f/u with ortho and cont use of brace.     Decreased strength of lower extremity- f/u with ortho and cont use of brace.     Knee pain, unspecified chronicity, unspecified laterality  -     meloxicam (MOBIC) 15 MG tablet; Take 1 tablet (15 mg total) by mouth once daily.  Dispense: 90 tablet; Refill: 1    Other orders  -     fluticasone propionate (FLONASE) 50 mcg/actuation nasal spray; 1 spray (50 mcg total) by Each Nostril route once daily.  Dispense: 16 g; Refill: 3           Follow up with PCP     Patient education provided from [facility]. Patient was counseled on when and how to seek emergent care.       Cris HIGHTOWER, REFUGIO, FNP-c   Department of Internal Medicine - [facility]  3:29 PM

## 2023-05-26 ENCOUNTER — PATIENT MESSAGE (OUTPATIENT)
Dept: ORTHOPEDICS | Facility: CLINIC | Age: 53
End: 2023-05-26
Payer: COMMERCIAL

## 2023-05-26 PROBLEM — R73.03 PREDIABETES: Status: ACTIVE | Noted: 2023-05-26

## 2023-05-29 ENCOUNTER — TELEPHONE (OUTPATIENT)
Dept: GASTROENTEROLOGY | Facility: CLINIC | Age: 53
End: 2023-05-29
Payer: COMMERCIAL

## 2023-05-29 DIAGNOSIS — S83.281A ACUTE TEAR LATERAL MENISCUS, RIGHT, INITIAL ENCOUNTER: Primary | ICD-10-CM

## 2023-05-29 RX ORDER — TRAMADOL HYDROCHLORIDE 50 MG/1
50 TABLET ORAL EVERY 6 HOURS PRN
Qty: 20 TABLET | Refills: 0 | Status: SHIPPED | OUTPATIENT
Start: 2023-05-29 | End: 2023-09-02 | Stop reason: SDUPTHER

## 2023-05-29 NOTE — TELEPHONE ENCOUNTER
----- Message from Pierce Silva MD sent at 5/29/2023  2:25 PM CDT -----  Please notify patient, the colon polyps were benign.

## 2023-06-01 ENCOUNTER — PATIENT MESSAGE (OUTPATIENT)
Dept: ORTHOPEDICS | Facility: CLINIC | Age: 53
End: 2023-06-01
Payer: COMMERCIAL

## 2023-09-02 DIAGNOSIS — S83.281A ACUTE TEAR LATERAL MENISCUS, RIGHT, INITIAL ENCOUNTER: ICD-10-CM

## 2023-09-05 RX ORDER — TRAMADOL HYDROCHLORIDE 50 MG/1
50 TABLET ORAL EVERY 6 HOURS PRN
Qty: 20 TABLET | Refills: 0 | Status: SHIPPED | OUTPATIENT
Start: 2023-09-05 | End: 2024-01-02 | Stop reason: SDUPTHER

## 2023-09-05 RX ORDER — SERTRALINE HYDROCHLORIDE 50 MG/1
50 TABLET, FILM COATED ORAL DAILY
Qty: 90 TABLET | Refills: 0 | Status: SHIPPED | OUTPATIENT
Start: 2023-09-05 | End: 2023-11-14 | Stop reason: SDUPTHER

## 2023-11-06 ENCOUNTER — PATIENT MESSAGE (OUTPATIENT)
Dept: INTERNAL MEDICINE | Facility: CLINIC | Age: 53
End: 2023-11-06
Payer: COMMERCIAL

## 2023-11-06 ENCOUNTER — OFFICE VISIT (OUTPATIENT)
Dept: ORTHOPEDICS | Facility: CLINIC | Age: 53
End: 2023-11-06
Payer: COMMERCIAL

## 2023-11-06 VITALS — BODY MASS INDEX: 33.65 KG/M2 | HEIGHT: 68 IN | WEIGHT: 222 LBS

## 2023-11-06 DIAGNOSIS — S83.281D TRAUMATIC TEAR OF LATERAL MENISCUS OF RIGHT KNEE, SUBSEQUENT ENCOUNTER: Primary | ICD-10-CM

## 2023-11-06 DIAGNOSIS — M17.11 PRIMARY OSTEOARTHRITIS OF RIGHT KNEE: ICD-10-CM

## 2023-11-06 DIAGNOSIS — R73.03 PREDIABETES: Primary | ICD-10-CM

## 2023-11-06 PROCEDURE — 99213 PR OFFICE/OUTPT VISIT, EST, LEVL III, 20-29 MIN: ICD-10-PCS | Mod: 25,S$GLB,, | Performed by: PHYSICIAN ASSISTANT

## 2023-11-06 PROCEDURE — 3044F HG A1C LEVEL LT 7.0%: CPT | Mod: CPTII,S$GLB,, | Performed by: PHYSICIAN ASSISTANT

## 2023-11-06 PROCEDURE — 3008F PR BODY MASS INDEX (BMI) DOCUMENTED: ICD-10-PCS | Mod: CPTII,S$GLB,, | Performed by: PHYSICIAN ASSISTANT

## 2023-11-06 PROCEDURE — 99999 PR PBB SHADOW E&M-EST. PATIENT-LVL III: CPT | Mod: PBBFAC,,, | Performed by: PHYSICIAN ASSISTANT

## 2023-11-06 PROCEDURE — 1160F RVW MEDS BY RX/DR IN RCRD: CPT | Mod: CPTII,S$GLB,, | Performed by: PHYSICIAN ASSISTANT

## 2023-11-06 PROCEDURE — 1159F MED LIST DOCD IN RCRD: CPT | Mod: CPTII,S$GLB,, | Performed by: PHYSICIAN ASSISTANT

## 2023-11-06 PROCEDURE — 20610 DRAIN/INJ JOINT/BURSA W/O US: CPT | Mod: RT,S$GLB,, | Performed by: PHYSICIAN ASSISTANT

## 2023-11-06 PROCEDURE — 1160F PR REVIEW ALL MEDS BY PRESCRIBER/CLIN PHARMACIST DOCUMENTED: ICD-10-PCS | Mod: CPTII,S$GLB,, | Performed by: PHYSICIAN ASSISTANT

## 2023-11-06 PROCEDURE — 1159F PR MEDICATION LIST DOCUMENTED IN MEDICAL RECORD: ICD-10-PCS | Mod: CPTII,S$GLB,, | Performed by: PHYSICIAN ASSISTANT

## 2023-11-06 PROCEDURE — 20610 LARGE JOINT ASPIRATION/INJECTION: R KNEE: ICD-10-PCS | Mod: RT,S$GLB,, | Performed by: PHYSICIAN ASSISTANT

## 2023-11-06 PROCEDURE — 99999 PR PBB SHADOW E&M-EST. PATIENT-LVL III: ICD-10-PCS | Mod: PBBFAC,,, | Performed by: PHYSICIAN ASSISTANT

## 2023-11-06 PROCEDURE — 99213 OFFICE O/P EST LOW 20 MIN: CPT | Mod: 25,S$GLB,, | Performed by: PHYSICIAN ASSISTANT

## 2023-11-06 PROCEDURE — 3008F BODY MASS INDEX DOCD: CPT | Mod: CPTII,S$GLB,, | Performed by: PHYSICIAN ASSISTANT

## 2023-11-06 PROCEDURE — 3044F PR MOST RECENT HEMOGLOBIN A1C LEVEL <7.0%: ICD-10-PCS | Mod: CPTII,S$GLB,, | Performed by: PHYSICIAN ASSISTANT

## 2023-11-06 RX ORDER — TRIAMCINOLONE ACETONIDE 40 MG/ML
40 INJECTION, SUSPENSION INTRA-ARTICULAR; INTRAMUSCULAR
Status: DISCONTINUED | OUTPATIENT
Start: 2023-11-06 | End: 2023-11-06 | Stop reason: HOSPADM

## 2023-11-06 RX ORDER — LIDOCAINE HYDROCHLORIDE 10 MG/ML
2 INJECTION INFILTRATION; PERINEURAL
Status: DISCONTINUED | OUTPATIENT
Start: 2023-11-06 | End: 2023-11-06 | Stop reason: HOSPADM

## 2023-11-06 RX ADMIN — TRIAMCINOLONE ACETONIDE 40 MG: 40 INJECTION, SUSPENSION INTRA-ARTICULAR; INTRAMUSCULAR at 11:11

## 2023-11-06 RX ADMIN — LIDOCAINE HYDROCHLORIDE 2 ML: 10 INJECTION INFILTRATION; PERINEURAL at 11:11

## 2023-11-06 NOTE — PROGRESS NOTES
"Patient ID: Toya Holbrook is a 52 y.o. female.    Chief Complaint: Follow-up of the Right Knee      HISTORY:  Toya Holbrook is a 52 y.o. female who returns to me today for follow up of right knee pain.  She was last seen by me 2/1/2023.  She went to [place] for the summer- did PT while she was there and did very well.  Her symptoms has significantly improved until she had a fall several days ago.  She has an increase in pain, swelling and postero-lateral knee pain.  She has occasional catching.      PMH/PSH/FamHx/SocHx:    Unchanged from prior visit.    ROS:  Constitution: Negative for chills, fever and weakness.   Respiratory: Negative for cough and shortness of breath.   Musculoskeletal: Positive for right knee  Psychiatric/Behavioral: The patient is not nervous/anxious.       PHYSICAL EXAM:   Ht 5' 7.99" (1.727 m)   Wt 100.7 kg (222 lb)   BMI 33.76 kg/m²   Right knee  Skin intact  Mild effusion  No warmth  TTP lateral joint line, popliteal fossa  Stable to testing  + Nora    ASSESSMENT/PLAN:    Toya was seen today for follow-up.    Diagnoses and all orders for this visit:    Traumatic tear of lateral meniscus of right knee, subsequent encounter  -     Large Joint Aspiration/Injection: R knee    Primary osteoarthritis of right knee  -     Large Joint Aspiration/Injection: R knee    - Right knee CSI performed today  - Rest, ice as needed  - Follow up if symptoms fail to improve      "

## 2023-11-06 NOTE — PROCEDURES
Large Joint Aspiration/Injection: R knee    Date/Time: 11/6/2023 11:30 AM    Performed by: Debby Lin PA-C  Authorized by: Debby Lin PA-C    Consent Done?:  Yes (Verbal)  Indications:  Pain  Timeout: prior to procedure the correct patient, procedure, and site was verified    Prep: patient was prepped and draped in usual sterile fashion    Local anesthetic:  Topical anesthetic    Details:  Needle Size:  22 G  Approach:  Anterolateral  Location:  Knee  Site:  R knee  Medications:  40 mg triamcinolone acetonide 40 mg/mL; 2 mL LIDOcaine HCL 10 mg/ml (1%) 10 mg/mL (1 %)  Patient tolerance:  Patient tolerated the procedure well with no immediate complications

## 2023-11-07 ENCOUNTER — TELEPHONE (OUTPATIENT)
Dept: INTERNAL MEDICINE | Facility: CLINIC | Age: 53
End: 2023-11-07
Payer: COMMERCIAL

## 2023-11-13 ENCOUNTER — LAB VISIT (OUTPATIENT)
Dept: LAB | Facility: HOSPITAL | Age: 53
End: 2023-11-13
Payer: COMMERCIAL

## 2023-11-13 DIAGNOSIS — R73.03 PREDIABETES: ICD-10-CM

## 2023-11-13 LAB
ESTIMATED AVG GLUCOSE: 114 MG/DL (ref 68–131)
HBA1C MFR BLD: 5.6 % (ref 4–5.6)

## 2023-11-13 PROCEDURE — 83036 HEMOGLOBIN GLYCOSYLATED A1C: CPT | Performed by: NURSE PRACTITIONER

## 2023-11-13 PROCEDURE — 36415 COLL VENOUS BLD VENIPUNCTURE: CPT | Performed by: NURSE PRACTITIONER

## 2023-11-14 ENCOUNTER — OFFICE VISIT (OUTPATIENT)
Dept: INTERNAL MEDICINE | Facility: CLINIC | Age: 53
End: 2023-11-14
Payer: COMMERCIAL

## 2023-11-14 VITALS
DIASTOLIC BLOOD PRESSURE: 84 MMHG | BODY MASS INDEX: 36.57 KG/M2 | SYSTOLIC BLOOD PRESSURE: 124 MMHG | WEIGHT: 233 LBS | HEIGHT: 67 IN | OXYGEN SATURATION: 98 % | HEART RATE: 79 BPM

## 2023-11-14 DIAGNOSIS — M25.561 CHRONIC PAIN OF RIGHT KNEE: ICD-10-CM

## 2023-11-14 DIAGNOSIS — M25.569 KNEE PAIN, UNSPECIFIED CHRONICITY, UNSPECIFIED LATERALITY: ICD-10-CM

## 2023-11-14 DIAGNOSIS — R73.03 PREDIABETES: Primary | ICD-10-CM

## 2023-11-14 DIAGNOSIS — F32.9 MAJOR DEPRESSION, CHRONIC: ICD-10-CM

## 2023-11-14 DIAGNOSIS — L65.9 HAIR THINNING: ICD-10-CM

## 2023-11-14 DIAGNOSIS — E66.09 CLASS 1 OBESITY DUE TO EXCESS CALORIES WITH BODY MASS INDEX (BMI) OF 32.0 TO 32.9 IN ADULT, UNSPECIFIED WHETHER SERIOUS COMORBIDITY PRESENT: ICD-10-CM

## 2023-11-14 DIAGNOSIS — E53.8 B12 DEFICIENCY: ICD-10-CM

## 2023-11-14 DIAGNOSIS — G89.29 CHRONIC PAIN OF RIGHT KNEE: ICD-10-CM

## 2023-11-14 PROCEDURE — 99396 PREV VISIT EST AGE 40-64: CPT | Mod: S$GLB,,, | Performed by: NURSE PRACTITIONER

## 2023-11-14 PROCEDURE — 99999 PR PBB SHADOW E&M-EST. PATIENT-LVL III: ICD-10-PCS | Mod: PBBFAC,,, | Performed by: NURSE PRACTITIONER

## 2023-11-14 PROCEDURE — 3008F PR BODY MASS INDEX (BMI) DOCUMENTED: ICD-10-PCS | Mod: CPTII,S$GLB,, | Performed by: NURSE PRACTITIONER

## 2023-11-14 PROCEDURE — 3074F PR MOST RECENT SYSTOLIC BLOOD PRESSURE < 130 MM HG: ICD-10-PCS | Mod: CPTII,S$GLB,, | Performed by: NURSE PRACTITIONER

## 2023-11-14 PROCEDURE — 3074F SYST BP LT 130 MM HG: CPT | Mod: CPTII,S$GLB,, | Performed by: NURSE PRACTITIONER

## 2023-11-14 PROCEDURE — 3079F PR MOST RECENT DIASTOLIC BLOOD PRESSURE 80-89 MM HG: ICD-10-PCS | Mod: CPTII,S$GLB,, | Performed by: NURSE PRACTITIONER

## 2023-11-14 PROCEDURE — 99999 PR PBB SHADOW E&M-EST. PATIENT-LVL III: CPT | Mod: PBBFAC,,, | Performed by: NURSE PRACTITIONER

## 2023-11-14 PROCEDURE — 1159F MED LIST DOCD IN RCRD: CPT | Mod: CPTII,S$GLB,, | Performed by: NURSE PRACTITIONER

## 2023-11-14 PROCEDURE — 3008F BODY MASS INDEX DOCD: CPT | Mod: CPTII,S$GLB,, | Performed by: NURSE PRACTITIONER

## 2023-11-14 PROCEDURE — 1159F PR MEDICATION LIST DOCUMENTED IN MEDICAL RECORD: ICD-10-PCS | Mod: CPTII,S$GLB,, | Performed by: NURSE PRACTITIONER

## 2023-11-14 PROCEDURE — 3079F DIAST BP 80-89 MM HG: CPT | Mod: CPTII,S$GLB,, | Performed by: NURSE PRACTITIONER

## 2023-11-14 PROCEDURE — 3044F PR MOST RECENT HEMOGLOBIN A1C LEVEL <7.0%: ICD-10-PCS | Mod: CPTII,S$GLB,, | Performed by: NURSE PRACTITIONER

## 2023-11-14 PROCEDURE — 3044F HG A1C LEVEL LT 7.0%: CPT | Mod: CPTII,S$GLB,, | Performed by: NURSE PRACTITIONER

## 2023-11-14 PROCEDURE — 99396 PR PREVENTIVE VISIT,EST,40-64: ICD-10-PCS | Mod: S$GLB,,, | Performed by: NURSE PRACTITIONER

## 2023-11-14 RX ORDER — BIMATOPROST 3 UG/ML
1 SOLUTION TOPICAL NIGHTLY
Qty: 3 ML | Refills: 12 | Status: SHIPPED | OUTPATIENT
Start: 2023-11-14 | End: 2024-02-05 | Stop reason: SDUPTHER

## 2023-11-14 RX ORDER — SERTRALINE HYDROCHLORIDE 100 MG/1
100 TABLET, FILM COATED ORAL DAILY
Qty: 90 TABLET | Refills: 3 | Status: SHIPPED | OUTPATIENT
Start: 2023-11-14 | End: 2024-02-05 | Stop reason: SDUPTHER

## 2023-11-14 RX ORDER — SEMAGLUTIDE 2.68 MG/ML
2 INJECTION, SOLUTION SUBCUTANEOUS
COMMUNITY
Start: 2023-09-15 | End: 2023-11-14 | Stop reason: SDUPTHER

## 2023-11-14 RX ORDER — SEMAGLUTIDE 2.68 MG/ML
2 INJECTION, SOLUTION SUBCUTANEOUS
Qty: 3 ML | Refills: 3 | Status: SHIPPED | OUTPATIENT
Start: 2023-11-14 | End: 2023-12-05 | Stop reason: SDUPTHER

## 2023-11-14 RX ORDER — SEMAGLUTIDE 2.68 MG/ML
2 INJECTION, SOLUTION SUBCUTANEOUS
Qty: 3 ML | Refills: 3 | Status: SHIPPED | OUTPATIENT
Start: 2023-11-14 | End: 2023-11-14 | Stop reason: SDUPTHER

## 2023-11-14 RX ORDER — ACETAMINOPHEN 325 MG/1
650 TABLET ORAL EVERY 6 HOURS PRN
COMMUNITY

## 2023-11-14 RX ORDER — CELECOXIB 100 MG/1
100 CAPSULE ORAL 2 TIMES DAILY PRN
Qty: 60 CAPSULE | Refills: 1 | Status: SHIPPED | OUTPATIENT
Start: 2023-11-14 | End: 2024-02-05 | Stop reason: SDUPTHER

## 2023-11-14 NOTE — PROGRESS NOTES
INTERNAL MEDICINE PROGRESS NOTE    CHIEF COMPLAINT     Chief Complaint   Patient presents with    Follow-up     Diabetes        HPI     Toya Holbrook is a 52 y.o. female with depression, anxiety, pre-DM, and chronic right knee pain who presents for a follow up visit today.    Here for follow up - last seen 2023    Pre-DM- controlled by diet in past - was on metformin in  but could not tolerate GI side effects   Lab Results   Component Value Date    HGBA1C 5.6 2023   3/27/2023 5.7  Taking ozepmic started through an online program and has lost weight and tolerated well, was able to obtain through insurance     GENIE- taking zoloft 50mg daily - states she is feeling scattered and can't concentrate. On this dose for years. Feeling mildly depression and loss of motivation  Also feeling anxious - not leaving the house       B12 def- taking 1000mcg     Knee pain- followed by ortho - had steroid injection at last visit   Taking meloxicam dose not think it works as well as celebrex       Past Medical History:  Past Medical History:   Diagnosis Date    Anemia     Anxiety     Depression     Diabetes mellitus, type 2     Migraine        Home Medications:  Prior to Admission medications    Medication Sig Start Date End Date Taking? Authorizing Provider   ALPRAZolam (XANAX) 1 MG tablet Take 1 tablet (1 mg total) by mouth nightly as needed for Anxiety. 23   Cris Peralta NP   cyanocobalamin (VITAMIN B-12) 1000 MCG tablet Take 1 tablet (1,000 mcg total) by mouth once daily. 23   Jaclyn Callahan MD   EPINEPHrine (EPIPEN) 0.3 mg/0.3 mL AtIn SMARTSI Pre-Filled Pen Syringe IM Once 21   Provider, Historical   fluticasone propionate (FLONASE) 50 mcg/actuation nasal spray 1 spray (50 mcg total) by Each Nostril route once daily. 23   Cris Peralta NP   meloxicam (MOBIC) 15 MG tablet Take 1 tablet (15 mg total) by mouth once daily. 23   Cris Peralta NP   sertraline  "(ZOLOFT) 50 MG tablet Take 1 tablet (50 mg total) by mouth once daily. 9/5/23   Pretus-Cris Weller, NP   traMADoL (ULTRAM) 50 mg tablet Take 1 tablet (50 mg total) by mouth every 6 (six) hours as needed for Pain. 9/5/23   Debby Lin, ALPHONSO   varicella-zoster gE-AS01B, PF, (SHINGRIX) 50 mcg/0.5 mL injection Inject into the muscle.  Patient not taking: Reported on 5/25/2023 1/30/23 11/14/23  Jesica Dillon, PharmD       Review of Systems:  Review of Systems   Constitutional:  Negative for chills and fever.   Eyes:  Negative for visual disturbance.   Respiratory:  Negative for cough, shortness of breath and wheezing.    Cardiovascular:  Negative for chest pain and palpitations.   Gastrointestinal:  Negative for abdominal pain, constipation, diarrhea, nausea and vomiting.   Genitourinary:  Negative for difficulty urinating.   Musculoskeletal:  Positive for arthralgias.   Neurological:  Negative for dizziness, light-headedness and headaches.       Health Maintainence:   Immunizations:  Health Maintenance         Date Due Completion Date    COVID-19 Vaccine (6 - 2023-24 season) 09/01/2023 10/20/2022    Mammogram 01/30/2024 1/30/2023    Hemoglobin A1c (Prediabetes) 11/13/2024 11/13/2023    Lipid Panel 03/27/2028 3/27/2023    Colorectal Cancer Screening 05/17/2028 5/17/2023    TETANUS VACCINE 02/01/2033 2/1/2023             PHYSICAL EXAM     /84 (BP Location: Left arm, Patient Position: Sitting, BP Method: Medium (Manual))   Pulse 79   Ht 5' 7" (1.702 m)   Wt 105.7 kg (233 lb 0.4 oz)   SpO2 98%   BMI 36.50 kg/m²     Physical Exam  Vitals reviewed.   Constitutional:       Appearance: She is well-developed.   HENT:      Head: Normocephalic.      Right Ear: External ear normal.      Left Ear: External ear normal.      Nose: Nose normal.      Mouth/Throat:      Pharynx: No oropharyngeal exudate.   Eyes:      Pupils: Pupils are equal, round, and reactive to light.   Neck:      Thyroid: No thyromegaly.      " Vascular: No JVD.      Trachea: No tracheal deviation.   Cardiovascular:      Rate and Rhythm: Normal rate and regular rhythm.      Heart sounds: Normal heart sounds. No murmur heard.     No friction rub. No gallop.   Pulmonary:      Effort: Pulmonary effort is normal. No respiratory distress.      Breath sounds: Normal breath sounds. No wheezing or rales.   Abdominal:      General: Bowel sounds are normal. There is no distension.      Palpations: Abdomen is soft.      Tenderness: There is no abdominal tenderness.   Musculoskeletal:         General: No tenderness. Normal range of motion.      Cervical back: Neck supple.        Legs:    Lymphadenopathy:      Cervical: No cervical adenopathy.   Skin:     General: Skin is warm and dry.      Findings: No rash.   Neurological:      Mental Status: She is alert and oriented to person, place, and time.   Psychiatric:         Behavior: Behavior normal.         LABS     Lab Results   Component Value Date    HGBA1C 5.6 11/13/2023     CMP  Sodium   Date Value Ref Range Status   03/27/2023 142 136 - 145 mmol/L Final     Potassium   Date Value Ref Range Status   03/27/2023 4.7 3.5 - 5.1 mmol/L Final     Chloride   Date Value Ref Range Status   03/27/2023 105 95 - 110 mmol/L Final     CO2   Date Value Ref Range Status   03/27/2023 27 23 - 29 mmol/L Final     Glucose   Date Value Ref Range Status   03/27/2023 104 70 - 110 mg/dL Final     BUN   Date Value Ref Range Status   03/27/2023 11 6 - 20 mg/dL Final     Creatinine   Date Value Ref Range Status   03/27/2023 0.7 0.5 - 1.4 mg/dL Final     Calcium   Date Value Ref Range Status   03/27/2023 9.3 8.7 - 10.5 mg/dL Final     Total Protein   Date Value Ref Range Status   03/27/2023 6.9 6.0 - 8.4 g/dL Final     Albumin   Date Value Ref Range Status   03/27/2023 4.0 3.5 - 5.2 g/dL Final     Total Bilirubin   Date Value Ref Range Status   03/27/2023 0.3 0.1 - 1.0 mg/dL Final     Comment:     For infants and newborns, interpretation of  results should be based  on gestational age, weight and in agreement with clinical  observations.    Premature Infant recommended reference ranges:  Up to 24 hours.............<8.0 mg/dL  Up to 48 hours............<12.0 mg/dL  3-5 days..................<15.0 mg/dL  6-29 days.................<15.0 mg/dL       Alkaline Phosphatase   Date Value Ref Range Status   03/27/2023 50 (L) 55 - 135 U/L Final     AST   Date Value Ref Range Status   03/27/2023 14 10 - 40 U/L Final     ALT   Date Value Ref Range Status   03/27/2023 16 10 - 44 U/L Final     Anion Gap   Date Value Ref Range Status   03/27/2023 10 8 - 16 mmol/L Final     Lab Results   Component Value Date    WBC 7.82 03/27/2023    HGB 13.6 03/27/2023    HCT 43.3 03/27/2023    MCV 95 03/27/2023     03/27/2023     Lab Results   Component Value Date    CHOL 196 03/27/2023    CHOL 177 01/30/2023    CHOL 181 10/20/2022     Lab Results   Component Value Date    HDL 62 03/27/2023    HDL 61 01/30/2023    HDL 60 10/20/2022     Lab Results   Component Value Date    LDLCALC 111.8 03/27/2023    LDLCALC 99.2 01/30/2023    LDLCALC 92.6 10/20/2022     Lab Results   Component Value Date    TRIG 111 03/27/2023    TRIG 84 01/30/2023    TRIG 142 10/20/2022     Lab Results   Component Value Date    CHOLHDL 31.6 03/27/2023    CHOLHDL 34.5 01/30/2023    CHOLHDL 33.1 10/20/2022     Lab Results   Component Value Date    TSH 1.542 03/27/2023       ASSESSMENT/PLAN     Toya Holbrook is a 52 y.o. female     Prediabetes- A1c stable. Will cont ozempic. Discussed diet and exercise   -     Discontinue: OZEMPIC 2 mg/dose (8 mg/3 mL) PnIj; Inject 2 mg into the skin every 7 days.  Dispense: 3 mL; Refill: 3  -     OZEMPIC 2 mg/dose (8 mg/3 mL) PnIj; Inject 2 mg into the skin every 7 days.  Dispense: 3 mL; Refill: 3    Class 1 obesity due to excess calories with body mass index (BMI) of 32.0 to 32.9 in adult, unspecified whether serious comorbidity present- stable. Will cont ozempic and cont  diet and exercise   -     Discontinue: OZEMPIC 2 mg/dose (8 mg/3 mL) PnIj; Inject 2 mg into the skin every 7 days.  Dispense: 3 mL; Refill: 3  -     OZEMPIC 2 mg/dose (8 mg/3 mL) PnIj; Inject 2 mg into the skin every 7 days.  Dispense: 3 mL; Refill: 3    Chronic pain of right knee- stable. Will f/u with ortho and cont celebrex     Major depression, chronic- poorly controlled. Will increase zoloft to 100mg   -     sertraline (ZOLOFT) 100 MG tablet; Take 1 tablet (100 mg total) by mouth once daily.  Dispense: 90 tablet; Refill: 3    B12 deficiency    Knee pain, unspecified chronicity, unspecified laterality- stable. Will f/u with ortho and cont celebrex   -     celecoxib (CELEBREX) 100 MG capsule; Take 1 capsule (100 mg total) by mouth 2 (two) times daily as needed for Pain.  Dispense: 60 capsule; Refill: 1    Hair thinning  -     bimatoprost (LATISSE) 0.03 % ophthalmic solution; Place 1 Application into both eyes every evening. Place one drop on applicator and apply evenly along the skin of the upper eyelid at base of eyelashes once daily at bedtime; repeat procedure for second eye (use a clean applicator).  Dispense: 3 mL; Refill: 12           Follow up with PCP    Patient education provided from [facility]. Patient was counseled on when and how to seek emergent care.       Cris HIGHTOWER, APRN, FNP-c   Department of Internal Medicine - [facility]  9:46 AM

## 2023-12-05 ENCOUNTER — PATIENT MESSAGE (OUTPATIENT)
Dept: INTERNAL MEDICINE | Facility: CLINIC | Age: 53
End: 2023-12-05
Payer: COMMERCIAL

## 2023-12-05 DIAGNOSIS — R73.03 PREDIABETES: ICD-10-CM

## 2023-12-05 DIAGNOSIS — E11.9 TYPE 2 DIABETES MELLITUS WITHOUT COMPLICATION, WITHOUT LONG-TERM CURRENT USE OF INSULIN: Primary | ICD-10-CM

## 2023-12-05 DIAGNOSIS — E66.09 CLASS 1 OBESITY DUE TO EXCESS CALORIES WITH BODY MASS INDEX (BMI) OF 32.0 TO 32.9 IN ADULT, UNSPECIFIED WHETHER SERIOUS COMORBIDITY PRESENT: ICD-10-CM

## 2023-12-05 RX ORDER — SEMAGLUTIDE 2.68 MG/ML
2 INJECTION, SOLUTION SUBCUTANEOUS
Qty: 3 ML | Refills: 3 | Status: SHIPPED | OUTPATIENT
Start: 2023-12-05 | End: 2023-12-06

## 2023-12-06 ENCOUNTER — TELEPHONE (OUTPATIENT)
Dept: INTERNAL MEDICINE | Facility: CLINIC | Age: 53
End: 2023-12-06
Payer: COMMERCIAL

## 2023-12-06 NOTE — TELEPHONE ENCOUNTER
----- Message from Annabel Villegas sent at 12/6/2023  3:19 PM CST -----  Contact: Reena with [facility]  [phone]  Reena with [facility] called requesting a call back from Cris Avila or the nurse, for clarification on rx semaglutide (OZEMPIC) 1 mg/dose (2, clarification on the drug as well as the dosage

## 2023-12-22 ENCOUNTER — PATIENT MESSAGE (OUTPATIENT)
Dept: ORTHOPEDICS | Facility: CLINIC | Age: 53
End: 2023-12-22
Payer: COMMERCIAL

## 2024-01-02 ENCOUNTER — OFFICE VISIT (OUTPATIENT)
Dept: INTERNAL MEDICINE | Facility: CLINIC | Age: 54
End: 2024-01-02
Payer: COMMERCIAL

## 2024-01-02 ENCOUNTER — PATIENT MESSAGE (OUTPATIENT)
Dept: BEHAVIORAL HEALTH | Facility: CLINIC | Age: 54
End: 2024-01-02
Payer: COMMERCIAL

## 2024-01-02 VITALS
HEIGHT: 67 IN | DIASTOLIC BLOOD PRESSURE: 70 MMHG | WEIGHT: 229 LBS | BODY MASS INDEX: 35.94 KG/M2 | SYSTOLIC BLOOD PRESSURE: 120 MMHG

## 2024-01-02 DIAGNOSIS — G47.30 SLEEP APNEA, UNSPECIFIED TYPE: ICD-10-CM

## 2024-01-02 DIAGNOSIS — E11.9 TYPE 2 DIABETES MELLITUS WITHOUT COMPLICATION, WITHOUT LONG-TERM CURRENT USE OF INSULIN: ICD-10-CM

## 2024-01-02 DIAGNOSIS — S83.281A ACUTE TEAR LATERAL MENISCUS, RIGHT, INITIAL ENCOUNTER: ICD-10-CM

## 2024-01-02 DIAGNOSIS — F41.9 ANXIETY: ICD-10-CM

## 2024-01-02 DIAGNOSIS — E66.01 SEVERE OBESITY (BMI 35.0-35.9 WITH COMORBIDITY): Primary | ICD-10-CM

## 2024-01-02 DIAGNOSIS — Z98.84 S/P LAPAROSCOPIC SLEEVE GASTRECTOMY: ICD-10-CM

## 2024-01-02 DIAGNOSIS — F32.9 MAJOR DEPRESSION, CHRONIC: ICD-10-CM

## 2024-01-02 PROBLEM — M25.561 RIGHT KNEE PAIN: Status: RESOLVED | Noted: 2023-02-09 | Resolved: 2024-01-02

## 2024-01-02 PROBLEM — M17.11 PRIMARY OSTEOARTHRITIS OF RIGHT KNEE: Status: ACTIVE | Noted: 2019-06-27

## 2024-01-02 PROBLEM — R74.8 LOW SERUM HDL: Status: RESOLVED | Noted: 2017-09-07 | Resolved: 2024-01-02

## 2024-01-02 PROBLEM — R29.898 DECREASED STRENGTH OF LOWER EXTREMITY: Status: RESOLVED | Noted: 2023-02-09 | Resolved: 2024-01-02

## 2024-01-02 PROBLEM — R74.8 LOW SERUM HDL: Status: ACTIVE | Noted: 2017-09-07

## 2024-01-02 PROBLEM — E66.811 CLASS 1 OBESITY DUE TO EXCESS CALORIES WITH BODY MASS INDEX (BMI) OF 32.0 TO 32.9 IN ADULT: Status: RESOLVED | Noted: 2021-09-10 | Resolved: 2024-01-02

## 2024-01-02 PROBLEM — E66.09 CLASS 1 OBESITY DUE TO EXCESS CALORIES WITH BODY MASS INDEX (BMI) OF 32.0 TO 32.9 IN ADULT: Status: RESOLVED | Noted: 2021-09-10 | Resolved: 2024-01-02

## 2024-01-02 PROBLEM — R26.2 DIFFICULTY WALKING: Status: RESOLVED | Noted: 2023-02-09 | Resolved: 2024-01-02

## 2024-01-02 PROCEDURE — 99999 PR PBB SHADOW E&M-EST. PATIENT-LVL III: CPT | Mod: PBBFAC,,, | Performed by: INTERNAL MEDICINE

## 2024-01-02 PROCEDURE — 3078F DIAST BP <80 MM HG: CPT | Mod: CPTII,S$GLB,, | Performed by: INTERNAL MEDICINE

## 2024-01-02 PROCEDURE — 99214 OFFICE O/P EST MOD 30 MIN: CPT | Mod: S$GLB,,, | Performed by: INTERNAL MEDICINE

## 2024-01-02 PROCEDURE — 3008F BODY MASS INDEX DOCD: CPT | Mod: CPTII,S$GLB,, | Performed by: INTERNAL MEDICINE

## 2024-01-02 PROCEDURE — 1159F MED LIST DOCD IN RCRD: CPT | Mod: CPTII,S$GLB,, | Performed by: INTERNAL MEDICINE

## 2024-01-02 PROCEDURE — 3074F SYST BP LT 130 MM HG: CPT | Mod: CPTII,S$GLB,, | Performed by: INTERNAL MEDICINE

## 2024-01-02 RX ORDER — BIMATOPROST 3 UG/ML
1 SOLUTION TOPICAL NIGHTLY
COMMUNITY
Start: 2023-11-14 | End: 2024-01-02

## 2024-01-02 RX ORDER — ALPRAZOLAM 1 MG/1
1 TABLET ORAL NIGHTLY PRN
Qty: 30 TABLET | Refills: 0 | Status: CANCELLED | OUTPATIENT
Start: 2024-01-02

## 2024-01-02 RX ORDER — ALPRAZOLAM 1 MG/1
1 TABLET ORAL NIGHTLY PRN
Qty: 30 TABLET | Refills: 0 | Status: SHIPPED | OUTPATIENT
Start: 2024-01-02

## 2024-01-02 NOTE — PROGRESS NOTES
Patient ID: Toya Holbrook is a 53 y.o. female.    Chief Complaint: Follow up    Assessment:       1. Severe obesity (BMI 35.0-35.9 with comorbidity)    2. Type 2 diabetes mellitus without complication, without long-term current use of insulin    3. S/P laparoscopic sleeve gastrectomy    4. Sleep apnea, unspecified type    5. Major depression, chronic    6. Anxiety          Plan:         1. Severe obesity (BMI 35.0-35.9 with comorbidity)    2. Type 2 diabetes mellitus without complication, without long-term current use of insulin  -     Discontinue: semaglutide (OZEMPIC) 1 mg/dose (2 mg/1.5 mL) PnIj; Inject 0.5 mg into the skin every 7 days.  Dispense: 1.5 mL; Refill: 11  -     semaglutide (OZEMPIC) 1 mg/dose (2 mg/1.5 mL) PnIj; Inject 1 mg into the skin every 7 days.  Dispense: 3 mL; Refill: 11    3. S/P laparoscopic sleeve gastrectomy    4. Sleep apnea, unspecified type  -     Home Sleep Study; Future    5. Major depression, chronic  -     Ambulatory referral/consult to Primary Care Behavioral Health (Non-Opioids); Future; Expected date: 01/09/2024    6. Anxiety  -     Ambulatory referral/consult to Primary Care Behavioral Health (Non-Opioids); Future; Expected date: 01/09/2024  -     ALPRAZolam (XANAX) 1 MG tablet; Take 1 tablet (1 mg total) by mouth nightly as needed for Anxiety.  Dispense: 30 tablet; Refill: 0      reviewed   YOVANI discussed, schedule home sleep study   Follow-up in 3-6 months with alfredo Irviner with problems in the interim    Subjective:   Here for follow-up of multiple medical issues    Diagnosis of IFG, she states diabetes many years ago but on the current chart, no A1cs over 6.  She was able to get semaglutide and is asking for an increase dose of this.  BMI is 35.  She had gastric bypass surgery about 20 years ago.  She is regained some weight, working hard on this.  YOVANI reviewed, she is willing to get a sleep study.    Discussed that insurance may not pay for  semaglutide for this diagnosis but she says they are currently paying for it.    Stable anxiety and depression, on medication.  [place], is willing to consider seeing a therapist here.  Trying to get exercise but injured her knee so has been limited in that regard.  Working on a weight loss program.  Sleeps about 6 hours most nights.  Not sure about snoring or stopping breathing, she is .    Patient Active Problem List:     Hives of unknown origin     Major depression, chronic     Anxiety     Family history of colon cancer: older brother     Decreased range of motion of right knee     Prediabetes     Primary osteoarthritis of right knee     S/P laparoscopic sleeve gastrectomy     Severe obesity (BMI 35.0-35.9 with comorbidity)             Review of Systems   Constitutional:  Positive for fatigue.   Cardiovascular:  Negative for chest pain.   Endocrine: Positive for polydipsia. Negative for polyphagia and polyuria.   Skin:  Negative for pallor.   Neurological:  Negative for dizziness, tremors, seizures, speech difficulty and weakness.   Psychiatric/Behavioral:  Negative for confusion. The patient is nervous/anxious.          Objective:      Physical Exam  Constitutional:       Appearance: She is well-developed.   HENT:      Head: Normocephalic and atraumatic.   Eyes:      Extraocular Movements: Extraocular movements intact.      Conjunctiva/sclera: Conjunctivae normal.   Neck:      Thyroid: No thyromegaly.   Pulmonary:      Effort: No respiratory distress.   Neurological:      General: No focal deficit present.      Mental Status: She is alert and oriented to person, place, and time.   Psychiatric:         Mood and Affect: Mood normal.         Behavior: Behavior normal.         Thought Content: Thought content normal.         Judgment: Judgment normal.             Health Maintenance Due   Topic Date Due    COVID-19 Vaccine (6 - 2023-24 season) 09/01/2023    Mammogram  01/30/2024

## 2024-01-03 RX ORDER — TRAMADOL HYDROCHLORIDE 50 MG/1
50 TABLET ORAL EVERY 6 HOURS PRN
Qty: 20 TABLET | Refills: 0 | Status: SHIPPED | OUTPATIENT
Start: 2024-01-03

## 2024-01-03 NOTE — TELEPHONE ENCOUNTER
----- Message from Todd Nash sent at 1/3/2024  8:15 AM CST -----  Patient was referred to the [facility] Non-Opioid program by PCP. CHW reached out to patient who stated [he/she] was not interested in enrolling in the [facility] Non-Opioid program.  CHW informed patient to notify [his/her] PCP of [his/her] wishes to engage in the [facility] Non-Opioid  program in the future.  CHW sent follow-up message to PCP via EPIC.

## 2024-01-16 ENCOUNTER — TELEPHONE (OUTPATIENT)
Dept: SLEEP MEDICINE | Facility: OTHER | Age: 54
End: 2024-01-16
Payer: COMMERCIAL

## 2024-01-23 ENCOUNTER — TELEPHONE (OUTPATIENT)
Dept: SLEEP MEDICINE | Facility: OTHER | Age: 54
End: 2024-01-23
Payer: COMMERCIAL

## 2024-01-31 ENCOUNTER — HOSPITAL ENCOUNTER (OUTPATIENT)
Dept: RADIOLOGY | Facility: OTHER | Age: 54
Discharge: HOME | End: 2024-01-31
Attending: INTERNAL MEDICINE
Payer: COMMERCIAL

## 2024-01-31 DIAGNOSIS — Z12.31 ENCOUNTER FOR SCREENING MAMMOGRAM FOR BREAST CANCER: ICD-10-CM

## 2024-01-31 PROCEDURE — 77063 BREAST TOMOSYNTHESIS BI: CPT | Mod: 26,,, | Performed by: RADIOLOGY

## 2024-01-31 PROCEDURE — 77067 SCR MAMMO BI INCL CAD: CPT | Mod: 26,,, | Performed by: RADIOLOGY

## 2024-01-31 PROCEDURE — 77067 SCR MAMMO BI INCL CAD: CPT | Mod: TC

## 2024-02-03 ENCOUNTER — ON-DEMAND VIRTUAL (OUTPATIENT)
Dept: URGENT CARE | Facility: CLINIC | Age: 54
End: 2024-02-03
Payer: COMMERCIAL

## 2024-02-03 ENCOUNTER — PATIENT MESSAGE (OUTPATIENT)
Dept: INTERNAL MEDICINE | Facility: CLINIC | Age: 54
End: 2024-02-03
Payer: COMMERCIAL

## 2024-02-03 DIAGNOSIS — U07.1 COVID-19: Primary | ICD-10-CM

## 2024-02-03 DIAGNOSIS — R05.1 ACUTE COUGH: Primary | ICD-10-CM

## 2024-02-03 PROCEDURE — 99213 OFFICE O/P EST LOW 20 MIN: CPT | Mod: 95,,, | Performed by: FAMILY MEDICINE

## 2024-02-03 RX ORDER — BENZONATATE 200 MG/1
200 CAPSULE ORAL 3 TIMES DAILY PRN
Qty: 30 CAPSULE | Refills: 0 | Status: SHIPPED | OUTPATIENT
Start: 2024-02-03 | End: 2024-02-13

## 2024-02-03 NOTE — PROGRESS NOTES
Subjective:      Patient ID: Toya Holbrook is a 53 y.o. female.    Vitals:  vitals were not taken for this visit.     Chief Complaint: COVID-19 Concerns      Visit Type: TELE AUDIOVISUAL    Present with the patient at the time of consultation: TELEMED PRESENT WITH PATIENT: None    Past Medical History:   Diagnosis Date    Anemia     Anxiety     Depression     Diabetes mellitus, type 2     Migraine      Past Surgical History:   Procedure Laterality Date    ADENOIDECTOMY       SECTION      x 2    CHOLECYSTECTOMY      COLONOSCOPY N/A 2023    Procedure: COLONOSCOPY;  Surgeon: Pierce Silva MD;  Location: [facility] ENDOSCOPY;  Service: Endoscopy;  Laterality: N/A;  referred by Cris Egan NP for abdominal pain and gi hemorrhage unspecified suprep instructions sent to pt portal.cf   pre-call attempted; no answer left message; MS    COSMETIC SURGERY      EYE SURGERY      HYSTERECTOMY      LAPAROSCOPIC GASTRIC BANDING      OOPHORECTOMY      TONSILLECTOMY       Review of patient's allergies indicates:   Allergen Reactions    Codeine Nausea Only     nausea     Current Outpatient Medications on File Prior to Visit   Medication Sig Dispense Refill    acetaminophen (TYLENOL) 325 MG tablet Take 650 mg by mouth every 6 (six) hours as needed.      ALPRAZolam (XANAX) 1 MG tablet Take 1 tablet (1 mg total) by mouth nightly as needed for Anxiety. 30 tablet 0    bimatoprost (LATISSE) 0.03 % ophthalmic solution Place 1 Application into both eyes every evening. Place one drop on applicator and apply evenly along the skin of the upper eyelid at base of eyelashes once daily at bedtime; repeat procedure for second eye (use a clean applicator). 3 mL 12    celecoxib (CELEBREX) 100 MG capsule Take 1 capsule (100 mg total) by mouth 2 (two) times daily as needed for Pain. 60 capsule 1    cyanocobalamin (VITAMIN B-12) 1000 MCG tablet Take 1 tablet (1,000 mcg total) by mouth once daily. 30 tablet 12     EPINEPHrine (EPIPEN) 0.3 mg/0.3 mL AtIn SMARTSI Pre-Filled Pen Syringe IM Once      fluticasone propionate (FLONASE) 50 mcg/actuation nasal spray 1 spray (50 mcg total) by Each Nostril route once daily. 16 g 3    multivit-min-iron-CA-FA 27-0.4 mg Tab Take 1 tablet by mouth.      semaglutide (OZEMPIC) 1 mg/dose (2 mg/1.5 mL) PnIj Inject 1 mg into the skin every 7 days. 3 mL 11    sertraline (ZOLOFT) 100 MG tablet Take 1 tablet (100 mg total) by mouth once daily. 90 tablet 3    traMADoL (ULTRAM) 50 mg tablet Take 1 tablet (50 mg total) by mouth every 6 (six) hours as needed for Pain. 20 tablet 0     Current Facility-Administered Medications on File Prior to Visit   Medication Dose Route Frequency Provider Last Rate Last Admin    cyanocobalamin injection 1,000 mcg  1,000 mcg Intramuscular Q30 Days Pretus-Cris Weller NP   1,000 mcg at 23 1455     Family History   Problem Relation Age of Onset    Cancer Mother         kidney    Diabetes Mother     Arthritis Mother     Depression Mother     Thyroid disease Father     Heart disease Father         MI    Alcohol abuse Father     Ovarian cancer Sister     Cancer Sister         cervical    No Known Problems Daughter     Diabetes Maternal Grandmother     Diabetes Paternal Grandmother     Cancer Brother 50        colon    Diabetes Brother     No Known Problems Son        Medications Ordered                [facility] - [place], [place] - [address] [facility]   [address] [address]    Telephone: [phone]   Fax: [phone]   Hours: Not open 24 hours                         E-Prescribed (1 of 1)              benzonatate (TESSALON) 200 MG capsule    Sig: Take 1 capsule (200 mg total) by mouth 3 (three) times daily as needed for Cough.       Start: 2/3/24     Quantity: 30 capsule Refills: 0                           Ohs Peq Odvv Intake    2/3/2024  2:21 PM CST - Filed by Patient   What is your current physical address in the  event of a medical emergency? [address] [address]   Are you able to take your vital signs? No   Please attach any relevant images or files          Cough  Chronicity: Had Covid in Mid January.  Test was negative on Jan 18 after symptoms improved.  She retested and was positive again.  retested again about 1 week ago and was negative.  Now can't sleep,  heaviness in chest and coughing. The cough is Non-productive. Associated symptoms include headaches, myalgias, nasal congestion and postnasal drip. Pertinent negatives include no chest pain, chills, ear congestion, ear pain, fever, heartburn, hemoptysis, rash, rhinorrhea, sore throat, shortness of breath, sweats, weight loss or wheezing. Nothing aggravates the symptoms.       Constitution: Negative for chills and fever.   HENT:  Positive for postnasal drip. Negative for ear pain and sore throat.    Cardiovascular:  Negative for chest pain.   Respiratory:  Positive for cough. Negative for bloody sputum, shortness of breath and wheezing.    Gastrointestinal:  Negative for heartburn.   Musculoskeletal:  Positive for muscle ache.   Skin:  Negative for rash.   Neurological:  Positive for headaches.        Objective:   The physical exam was conducted virtually.  Physical Exam   Constitutional: She is oriented to person, place, and time.   HENT:   Head: Normocephalic.   Ears:   Right Ear: External ear normal.   Left Ear: External ear normal.   Nose: Congestion present.   Eyes: Conjunctivae are normal.   Pulmonary/Chest: Effort normal. No respiratory distress.   Abdominal: Normal appearance.   Neurological: She is alert and oriented to person, place, and time.   Skin: Skin is no rash.   Psychiatric: Her behavior is normal.       Assessment:     1. COVID-19        Plan:       COVID-19  -     benzonatate (TESSALON) 200 MG capsule; Take 1 capsule (200 mg total) by mouth 3 (three) times daily as needed for Cough.  Dispense: 30 capsule; Refill: 0     - Patient  advised that she needs to be seen in person     - I counseled the patient on general home care guidelines for cough and congestion including increasing fluid intake, getting plenty of rest and use of OTC cough and cold medications.  I recommended guafenesin for congestion and dextromethorphan as directed for cough.  A brand like Mucinex DM is recommended.  Avoidance of decongestants is recommended for patients with heart problems and hypertension.  Extra vitamin C may also benefit.  Return to clinic if symptoms last longer than 10 days or sooner if worsen with symptoms like fever > 100.4, severe sinus pain or headache, thick yellow nasal discharge or sputum, dehydration or lethargy.

## 2024-02-05 DIAGNOSIS — M25.569 KNEE PAIN, UNSPECIFIED CHRONICITY, UNSPECIFIED LATERALITY: ICD-10-CM

## 2024-02-05 DIAGNOSIS — L65.9 HAIR THINNING: ICD-10-CM

## 2024-02-05 DIAGNOSIS — F32.9 MAJOR DEPRESSION, CHRONIC: ICD-10-CM

## 2024-02-05 RX ORDER — BIMATOPROST 3 UG/ML
1 SOLUTION TOPICAL NIGHTLY
Qty: 3 ML | Refills: 12 | Status: SHIPPED | OUTPATIENT
Start: 2024-02-05

## 2024-02-05 RX ORDER — SERTRALINE HYDROCHLORIDE 100 MG/1
100 TABLET, FILM COATED ORAL DAILY
Qty: 90 TABLET | Refills: 3 | Status: SHIPPED | OUTPATIENT
Start: 2024-02-05

## 2024-02-05 RX ORDER — CELECOXIB 100 MG/1
100 CAPSULE ORAL 2 TIMES DAILY PRN
Qty: 60 CAPSULE | Refills: 1 | Status: SHIPPED | OUTPATIENT
Start: 2024-02-05 | End: 2024-02-05 | Stop reason: SDUPTHER

## 2024-02-05 RX ORDER — CELECOXIB 100 MG/1
100 CAPSULE ORAL 2 TIMES DAILY PRN
Qty: 60 CAPSULE | Refills: 1 | Status: SHIPPED | OUTPATIENT
Start: 2024-02-05

## 2024-02-05 RX ORDER — FLUTICASONE PROPIONATE 50 MCG
1 SPRAY, SUSPENSION (ML) NASAL DAILY
Qty: 16 G | Refills: 3 | Status: SHIPPED | OUTPATIENT
Start: 2024-02-05

## 2024-02-06 ENCOUNTER — PATIENT MESSAGE (OUTPATIENT)
Dept: INTERNAL MEDICINE | Facility: CLINIC | Age: 54
End: 2024-02-06
Payer: COMMERCIAL

## 2024-02-06 ENCOUNTER — HOSPITAL ENCOUNTER (OUTPATIENT)
Dept: RADIOLOGY | Facility: OTHER | Age: 54
Discharge: HOME | End: 2024-02-06
Attending: NURSE PRACTITIONER
Payer: COMMERCIAL

## 2024-02-06 ENCOUNTER — TELEPHONE (OUTPATIENT)
Dept: SLEEP MEDICINE | Facility: OTHER | Age: 54
End: 2024-02-06
Payer: COMMERCIAL

## 2024-02-06 DIAGNOSIS — R05.1 ACUTE COUGH: ICD-10-CM

## 2024-02-06 PROCEDURE — 71046 X-RAY EXAM CHEST 2 VIEWS: CPT | Mod: 26,,, | Performed by: RADIOLOGY

## 2024-02-06 PROCEDURE — 71046 X-RAY EXAM CHEST 2 VIEWS: CPT | Mod: TC,FY

## 2024-02-06 NOTE — TELEPHONE ENCOUNTER
Pt refused apt today. Pt asked to be scheduled to see someone tomorrow. Apt scheduled. Pt informed.

## 2024-02-07 ENCOUNTER — OFFICE VISIT (OUTPATIENT)
Dept: INTERNAL MEDICINE | Facility: CLINIC | Age: 54
End: 2024-02-07
Payer: COMMERCIAL

## 2024-02-07 VITALS
SYSTOLIC BLOOD PRESSURE: 122 MMHG | WEIGHT: 229.75 LBS | HEART RATE: 90 BPM | OXYGEN SATURATION: 98 % | HEIGHT: 67 IN | BODY MASS INDEX: 36.06 KG/M2 | DIASTOLIC BLOOD PRESSURE: 74 MMHG

## 2024-02-07 DIAGNOSIS — R05.8 POST-VIRAL COUGH SYNDROME: ICD-10-CM

## 2024-02-07 DIAGNOSIS — B99.9 SUPERIMPOSED INFECTION: Primary | ICD-10-CM

## 2024-02-07 PROCEDURE — 99213 OFFICE O/P EST LOW 20 MIN: CPT | Mod: S$GLB,,, | Performed by: STUDENT IN AN ORGANIZED HEALTH CARE EDUCATION/TRAINING PROGRAM

## 2024-02-07 PROCEDURE — 3008F BODY MASS INDEX DOCD: CPT | Mod: CPTII,S$GLB,, | Performed by: STUDENT IN AN ORGANIZED HEALTH CARE EDUCATION/TRAINING PROGRAM

## 2024-02-07 PROCEDURE — 1159F MED LIST DOCD IN RCRD: CPT | Mod: CPTII,S$GLB,, | Performed by: STUDENT IN AN ORGANIZED HEALTH CARE EDUCATION/TRAINING PROGRAM

## 2024-02-07 PROCEDURE — 99999 PR PBB SHADOW E&M-EST. PATIENT-LVL V: CPT | Mod: PBBFAC,,, | Performed by: STUDENT IN AN ORGANIZED HEALTH CARE EDUCATION/TRAINING PROGRAM

## 2024-02-07 PROCEDURE — 1160F RVW MEDS BY RX/DR IN RCRD: CPT | Mod: CPTII,S$GLB,, | Performed by: STUDENT IN AN ORGANIZED HEALTH CARE EDUCATION/TRAINING PROGRAM

## 2024-02-07 PROCEDURE — 3078F DIAST BP <80 MM HG: CPT | Mod: CPTII,S$GLB,, | Performed by: STUDENT IN AN ORGANIZED HEALTH CARE EDUCATION/TRAINING PROGRAM

## 2024-02-07 PROCEDURE — 3074F SYST BP LT 130 MM HG: CPT | Mod: CPTII,S$GLB,, | Performed by: STUDENT IN AN ORGANIZED HEALTH CARE EDUCATION/TRAINING PROGRAM

## 2024-02-07 RX ORDER — AMOXICILLIN AND CLAVULANATE POTASSIUM 875; 125 MG/1; MG/1
1 TABLET, FILM COATED ORAL 2 TIMES DAILY
Qty: 14 TABLET | Refills: 0 | Status: SHIPPED | OUTPATIENT
Start: 2024-02-07 | End: 2024-02-14

## 2024-02-07 NOTE — PROGRESS NOTES
[facility] ACUTE VIS[facility]    CHIEF COMPLAINT:   Chief Complaint   Patient presents with    Cough     Pt states she had Covid in jan. She states that she's still feeling terrible she's fatigue, have an cough and body hurts.       HISTORY OF PRESENT ILLNESS: Toya Mendoza is a 53 y.o. female who presents here today for evaluation of persistent cough, fatigue, and body aches after having COVID. She first was symptomatic early-January and tested positive at that time. After isolating, symptoms initially improved and she re-tested negative. She went on a trip to [place] and [place] in mid-January and started having symptoms again - she tested for COVID again and was positive once again. She isolated in [place] with her daughter and symptoms improved slightly. She returned to [place] and has had persistent symptoms since then - congestion, rhinorrhea, sneezing, post-nasal drip, productive cough, fatigue, body aches, lightheadedness. She has been coughing to the point that she has vomited a few times. She had a virtual urgent care visit 2/3/24 and was prescribed benzonatate at that time. This has helped slightly. Denies any  new fever or chills. She had a CXR yesterday that did not show pneumonia.      REVIEW OF SYSTEMS:    Review of Systems   Constitutional:  Positive for malaise/fatigue. Negative for chills, diaphoresis and fever.   HENT:  Positive for congestion and sore throat. Negative for ear discharge, ear pain and sinus pain.         +rhinorrhea, sneezing, post-nasal drip   Respiratory:  Positive for cough, sputum production and shortness of breath. Negative for hemoptysis and wheezing.    Cardiovascular:  Positive for chest pain (tightness). Negative for palpitations and orthopnea.   Gastrointestinal:  Positive for vomiting (when coughing hard). Negative for abdominal pain, constipation, diarrhea, heartburn and nausea.   Genitourinary:  Negative for dysuria, frequency and urgency.    Musculoskeletal:  Positive for myalgias. Negative for joint pain.   Neurological:  Positive for dizziness and weakness. Negative for headaches.       MEDICAL HISTORY:    Past Medical History:   Diagnosis Date    Anemia     Anxiety     Depression     Diabetes mellitus, type 2     Migraine        MEDICATIONS:    Current Outpatient Medications on File Prior to Visit   Medication Sig Dispense Refill    acetaminophen (TYLENOL) 325 MG tablet Take 650 mg by mouth every 6 (six) hours as needed.      ALPRAZolam (XANAX) 1 MG tablet Take 1 tablet (1 mg total) by mouth nightly as needed for Anxiety. 30 tablet 0    benzonatate (TESSALON) 200 MG capsule Take 1 capsule (200 mg total) by mouth 3 (three) times daily as needed for Cough. 30 capsule 0    bimatoprost (LATISSE) 0.03 % ophthalmic solution Place 1 Application into both eyes every evening. Place one drop on applicator and apply evenly along the skin of the upper eyelid at base of eyelashes once daily at bedtime; repeat procedure for second eye (use a clean applicator). 3 mL 12    celecoxib (CELEBREX) 100 MG capsule Take 1 capsule (100 mg total) by mouth 2 (two) times daily as needed for Pain. 60 capsule 1    cyanocobalamin (VITAMIN B-12) 1000 MCG tablet Take 1 tablet (1,000 mcg total) by mouth once daily. 30 tablet 12    EPINEPHrine (EPIPEN) 0.3 mg/0.3 mL AtIn SMARTSI Pre-Filled Pen Syringe IM Once      fluticasone propionate (FLONASE) 50 mcg/actuation nasal spray 1 spray (50 mcg total) by Each Nostril route once daily. 16 g 3    multivit-min-iron-CA-FA 27-0.4 mg Tab Take 1 tablet by mouth.      semaglutide (OZEMPIC) 1 mg/dose (2 mg/1.5 mL) PnIj Inject 1 mg into the skin every 7 days. 3 mL 11    sertraline (ZOLOFT) 100 MG tablet Take 1 tablet (100 mg total) by mouth once daily. 90 tablet 3    traMADoL (ULTRAM) 50 mg tablet Take 1 tablet (50 mg total) by mouth every 6 (six) hours as needed for Pain. 20 tablet 0     Current Facility-Administered Medications on File  "Prior to Visit   Medication Dose Route Frequency Provider Last Rate Last Admin    cyanocobalamin injection 1,000 mcg  1,000 mcg Intramuscular Q30 Days Pretus-Cris Weller, NP   1,000 mcg at 02/01/23 1455       PHYSICAL EXAM:    /74 (BP Location: Right arm, Patient Position: Sitting)   Pulse 90   Ht 5' 7" (1.702 m)   Wt 104.2 kg (229 lb 11.5 oz)   SpO2 98%   BMI 35.98 kg/m²     Physical Exam  Vitals and nursing note reviewed.   Constitutional:       General: She is not in acute distress.     Appearance: She is ill-appearing. She is not toxic-appearing or diaphoretic.   HENT:      Head: Normocephalic and atraumatic.      Nose: Congestion present. No rhinorrhea.   Eyes:      Extraocular Movements: Extraocular movements intact.      Conjunctiva/sclera: Conjunctivae normal.      Pupils: Pupils are equal, round, and reactive to light.   Cardiovascular:      Rate and Rhythm: Normal rate and regular rhythm.      Heart sounds: Normal heart sounds. No murmur heard.  Pulmonary:      Effort: Pulmonary effort is normal. No respiratory distress.      Breath sounds: Normal breath sounds. No stridor. No wheezing, rhonchi or rales.      Comments: Frequent cough  Musculoskeletal:         General: No deformity. Normal range of motion.      Cervical back: No tenderness.   Lymphadenopathy:      Cervical: No cervical adenopathy.   Skin:     Findings: No lesion or rash.   Neurological:      General: No focal deficit present.      Mental Status: She is alert.      Motor: No weakness.      Gait: Gait normal.   Psychiatric:         Mood and Affect: Mood normal.         Behavior: Behavior normal.         Thought Content: Thought content normal.         Judgment: Judgment normal.           IMAGING:    XR CHEST PA AND LATERAL     CLINICAL HISTORY:  Acute cough     FINDINGS:  Chest two views: Heart size is normal.  Lungs are clear and the bones are noncontributory.     Impression:     No acute process seen.        Electronically " signed by: Joel Mustafa MD  Date:                                            02/06/2024  Time:                                           10:17        ASSESSMENT & PLAN:    Toya was seen today for cough.    Diagnoses and all orders for this visit:    Superimposed infection  Post-viral cough syndrome  Patient presents with persistent/worsening congestion, rhinorrhea, post-nasal drip, and productive cough after having COVID ~1 month ago.   On exam, vital signs are stable. Patient is slightly ill appearing but in no acute distress. Congestion and rhinorrhea noted. Normal respiratory rate and effort. Lungs are clear to ausculation without wheezing, rales, or rhonchi. Frequent cough noted.   CXR yesterday without any pneumonia.  Possible post-COVID cough but concern for superimposed bacterial infection given worsening/chronicity of symptoms.  Augmentin x 7 days prescribed.   Tessalon PRN for cough.   Flonase daily.  Return/ER precautions discussed.  -     amoxicillin-clavulanate 875-125mg (AUGMENTIN) 875-125 mg per tablet; Take 1 tablet by mouth 2 (two) times daily. for 7 days          Stefany Camilo MD  [facility]

## 2024-02-07 NOTE — PATIENT INSTRUCTIONS
RESPIRATORY INFECTION  Augmentin has been prescribed. It is very important that you complete this entire course of antibiotics.Taking your antibiotic with a meal or snack can help prevent side effects like stomach upset - you may also trying taking an over-the-counter probiotic supplement.  Flonase [fluticasone propionate] nasal spray - use daily for prevention of sinus congestion, post-nasal drip, and chronic cough. This has been prescribed but is also available over the counter. This is safe to use long term.  Tessalon [benzonatate] - use as needed for coughing.  Sinus saline rinses - I recommend using at least twice daily, or as many times per day as needed. Use before nasal spray.  Return for further evaluation if your symptoms continue despite treatment.

## 2024-02-23 ENCOUNTER — HOSPITAL ENCOUNTER (OUTPATIENT)
Dept: RADIOLOGY | Facility: HOSPITAL | Age: 54
Discharge: HOME | End: 2024-02-23
Attending: NURSE PRACTITIONER
Payer: COMMERCIAL

## 2024-02-23 ENCOUNTER — PATIENT MESSAGE (OUTPATIENT)
Dept: INTERNAL MEDICINE | Facility: CLINIC | Age: 54
End: 2024-02-23

## 2024-02-23 ENCOUNTER — OFFICE VISIT (OUTPATIENT)
Dept: INTERNAL MEDICINE | Facility: CLINIC | Age: 54
End: 2024-02-23
Payer: COMMERCIAL

## 2024-02-23 ENCOUNTER — TELEPHONE (OUTPATIENT)
Dept: INTERNAL MEDICINE | Facility: CLINIC | Age: 54
End: 2024-02-23

## 2024-02-23 VITALS
SYSTOLIC BLOOD PRESSURE: 116 MMHG | WEIGHT: 226.19 LBS | OXYGEN SATURATION: 97 % | BODY MASS INDEX: 35.5 KG/M2 | HEART RATE: 98 BPM | DIASTOLIC BLOOD PRESSURE: 68 MMHG | HEIGHT: 67 IN

## 2024-02-23 DIAGNOSIS — R53.83 FATIGUE, UNSPECIFIED TYPE: ICD-10-CM

## 2024-02-23 DIAGNOSIS — Z98.84 S/P LAPAROSCOPIC SLEEVE GASTRECTOMY: ICD-10-CM

## 2024-02-23 DIAGNOSIS — R07.9 CHEST PAIN, UNSPECIFIED TYPE: ICD-10-CM

## 2024-02-23 DIAGNOSIS — F32.9 MAJOR DEPRESSION, CHRONIC: ICD-10-CM

## 2024-02-23 DIAGNOSIS — U07.1 COVID-19: Primary | ICD-10-CM

## 2024-02-23 DIAGNOSIS — F41.9 ANXIETY: ICD-10-CM

## 2024-02-23 DIAGNOSIS — R06.02 SHORTNESS OF BREATH: ICD-10-CM

## 2024-02-23 DIAGNOSIS — R73.03 PREDIABETES: ICD-10-CM

## 2024-02-23 DIAGNOSIS — R07.9 CHEST PAIN, UNSPECIFIED TYPE: Primary | ICD-10-CM

## 2024-02-23 DIAGNOSIS — U07.1 COVID-19: ICD-10-CM

## 2024-02-23 DIAGNOSIS — R05.1 ACUTE COUGH: ICD-10-CM

## 2024-02-23 DIAGNOSIS — M17.11 PRIMARY OSTEOARTHRITIS OF RIGHT KNEE: ICD-10-CM

## 2024-02-23 PROCEDURE — 1159F MED LIST DOCD IN RCRD: CPT | Mod: CPTII,S$GLB,, | Performed by: NURSE PRACTITIONER

## 2024-02-23 PROCEDURE — 3074F SYST BP LT 130 MM HG: CPT | Mod: CPTII,S$GLB,, | Performed by: NURSE PRACTITIONER

## 2024-02-23 PROCEDURE — 71275 CT ANGIOGRAPHY CHEST: CPT | Mod: TC

## 2024-02-23 PROCEDURE — 99999 PR PBB SHADOW E&M-EST. PATIENT-LVL IV: CPT | Mod: PBBFAC,,, | Performed by: NURSE PRACTITIONER

## 2024-02-23 PROCEDURE — 3078F DIAST BP <80 MM HG: CPT | Mod: CPTII,S$GLB,, | Performed by: NURSE PRACTITIONER

## 2024-02-23 PROCEDURE — 3008F BODY MASS INDEX DOCD: CPT | Mod: CPTII,S$GLB,, | Performed by: NURSE PRACTITIONER

## 2024-02-23 PROCEDURE — 71275 CT ANGIOGRAPHY CHEST: CPT | Mod: 26,,, | Performed by: RADIOLOGY

## 2024-02-23 PROCEDURE — 25500020 PHARM REV CODE 255: Performed by: NURSE PRACTITIONER

## 2024-02-23 PROCEDURE — 99214 OFFICE O/P EST MOD 30 MIN: CPT | Mod: S$GLB,,, | Performed by: NURSE PRACTITIONER

## 2024-02-23 PROCEDURE — 71250 CT THORAX DX C-: CPT | Mod: TC

## 2024-02-23 PROCEDURE — 71250 CT THORAX DX C-: CPT | Mod: 26,,, | Performed by: RADIOLOGY

## 2024-02-23 RX ADMIN — IOHEXOL 100 ML: 350 INJECTION, SOLUTION INTRAVENOUS at 07:02

## 2024-02-23 NOTE — PROGRESS NOTES
INTERNAL MEDICINE PROGRESS/URGENT CARE NOTE    CHIEF COMPLAINT     Chief Complaint   Patient presents with    COVID-19 Post Vaccine Symptoms       HPI     Toya Mendoza is a 53 y.o. female with pre-Dm, anxiety, depression, s/p lap sleeve gastrectomy who presents for with c/o fatigue and cough following Covid.    States she was dx with covid in early January   Started with headache, cough, shortness of breath and fatigue   Repeated test and it was negative.   Flew to [place] and had rebound symptoms: headache, nausea and vomiting     Seen by KERRY Camilo - 2/7/2024  CXR yesterday without any pneumonia.  Possible post-COVID cough but concern for superimposed bacterial infection given worsening/chronicity of symptoms.  Augmentin x 7 days prescribed.   Tessalon PRN for cough.   Flonase daily.  Cannot tolerate Augmentin     Seen by dentist - had implant surgery and was started on Amoxicillin 500mg TID     Her main complaints are continue cough and shortness of breath and fatigue and headaches   Cough- is productive with clear sputum         Past Medical History:  Past Medical History:   Diagnosis Date    Anemia     Anxiety     Depression     Diabetes mellitus, type 2     Migraine        Home Medications:  Prior to Admission medications    Medication Sig Start Date End Date Taking? Authorizing Provider   acetaminophen (TYLENOL) 325 MG tablet Take 650 mg by mouth every 6 (six) hours as needed.   Yes Provider, Historical   ALPRAZolam (XANAX) 1 MG tablet Take 1 tablet (1 mg total) by mouth nightly as needed for Anxiety. 1/2/24  Yes Jaclyn Callahan MD   bimatoprost (LATISSE) 0.03 % ophthalmic solution Place 1 Application into both eyes every evening. Place one drop on applicator and apply evenly along the skin of the upper eyelid at base of eyelashes once daily at bedtime; repeat procedure for second eye (use a clean applicator). 2/5/24  Yes Pretus-Cris Weller NP   celecoxib (CELEBREX) 100 MG capsule Take 1  capsule (100 mg total) by mouth 2 (two) times daily as needed for Pain. 24  Yes Cris Peralta NP   cyanocobalamin (VITAMIN B-12) 1000 MCG tablet Take 1 tablet (1,000 mcg total) by mouth once daily. 23  Yes Jaclyn Callahan MD   EPINEPHrine (EPIPEN) 0.3 mg/0.3 mL AtIn SMARTSI Pre-Filled Pen Syringe IM Once 21  Yes Provider, Historical   fluticasone propionate (FLONASE) 50 mcg/actuation nasal spray 1 spray (50 mcg total) by Each Nostril route once daily. 24  Yes Cris Peralta NP   multivit-min-iron-CA-FA 27-0.4 mg Tab Take 1 tablet by mouth.   Yes Provider, Historical   semaglutide (OZEMPIC) 1 mg/dose (2 mg/1.5 mL) PnIj Inject 1 mg into the skin every 7 days. 24 Yes Jaclyn Callahan MD   sertraline (ZOLOFT) 100 MG tablet Take 1 tablet (100 mg total) by mouth once daily. 24  Yes Cris Peralta NP   traMADoL (ULTRAM) 50 mg tablet Take 1 tablet (50 mg total) by mouth every 6 (six) hours as needed for Pain. 1/3/24  Yes Marly Sawyer, ALPHONSO       Review of Systems:  Review of Systems   Constitutional:  Positive for fatigue. Negative for chills and fever.   Respiratory:  Positive for cough, chest tightness and shortness of breath. Negative for wheezing.    Cardiovascular:  Negative for chest pain and palpitations.   Endocrine: Positive for polydipsia. Negative for polyphagia and polyuria.   Skin:  Negative for pallor.   Neurological:  Positive for weakness and headaches. Negative for dizziness, tremors, seizures and speech difficulty.   Psychiatric/Behavioral:  Negative for confusion. The patient is nervous/anxious.        Health Maintainence:   Immunizations:  Health Maintenance         Date Due Completion Date    COVID-19 Vaccine ( season) 2023 10/20/2022    Hemoglobin A1c (Prediabetes) 2024    Mammogram 2025    Lipid Panel 2028 3/27/2023    Colorectal Cancer Screening 2028    TETANUS  "VACCINE 08/29/2033 8/29/2023             PHYSICAL EXAM     /68 (BP Location: Right arm, Patient Position: Sitting, BP Method: Large (Manual))   Pulse 98   Ht 5' 7" (1.702 m)   Wt 102.6 kg (226 lb 3.1 oz)   SpO2 97%   BMI 35.43 kg/m²     Physical Exam  Vitals reviewed.   Constitutional:       Appearance: She is well-developed.   HENT:      Head: Normocephalic.      Right Ear: External ear normal.      Left Ear: External ear normal.      Nose: Nose normal.      Mouth/Throat:      Pharynx: No oropharyngeal exudate.   Eyes:      Pupils: Pupils are equal, round, and reactive to light.   Neck:      Thyroid: No thyromegaly.      Vascular: No JVD.      Trachea: No tracheal deviation.   Cardiovascular:      Rate and Rhythm: Normal rate and regular rhythm.      Heart sounds: Normal heart sounds. No murmur heard.     No friction rub. No gallop.   Pulmonary:      Effort: Pulmonary effort is normal. No respiratory distress.      Breath sounds: Normal breath sounds. No wheezing or rales.   Abdominal:      General: Bowel sounds are normal. There is no distension.      Palpations: Abdomen is soft.      Tenderness: There is no abdominal tenderness.   Musculoskeletal:         General: No tenderness. Normal range of motion.      Cervical back: Neck supple.   Lymphadenopathy:      Cervical: No cervical adenopathy.   Skin:     General: Skin is warm and dry.      Findings: No rash.   Neurological:      Mental Status: She is alert and oriented to person, place, and time.   Psychiatric:         Behavior: Behavior normal.         LABS     Lab Results   Component Value Date    HGBA1C 5.6 11/13/2023     CMP  Sodium   Date Value Ref Range Status   03/27/2023 142 136 - 145 mmol/L Final     Potassium   Date Value Ref Range Status   03/27/2023 4.7 3.5 - 5.1 mmol/L Final     Chloride   Date Value Ref Range Status   03/27/2023 105 95 - 110 mmol/L Final     CO2   Date Value Ref Range Status   03/27/2023 27 23 - 29 mmol/L Final     Glucose "   Date Value Ref Range Status   03/27/2023 104 70 - 110 mg/dL Final     BUN   Date Value Ref Range Status   03/27/2023 11 6 - 20 mg/dL Final     Creatinine   Date Value Ref Range Status   03/27/2023 0.7 0.5 - 1.4 mg/dL Final     Calcium   Date Value Ref Range Status   03/27/2023 9.3 8.7 - 10.5 mg/dL Final     Total Protein   Date Value Ref Range Status   03/27/2023 6.9 6.0 - 8.4 g/dL Final     Albumin   Date Value Ref Range Status   03/27/2023 4.0 3.5 - 5.2 g/dL Final     Total Bilirubin   Date Value Ref Range Status   03/27/2023 0.3 0.1 - 1.0 mg/dL Final     Comment:     For infants and newborns, interpretation of results should be based  on gestational age, weight and in agreement with clinical  observations.    Premature Infant recommended reference ranges:  Up to 24 hours.............<8.0 mg/dL  Up to 48 hours............<12.0 mg/dL  3-5 days..................<15.0 mg/dL  6-29 days.................<15.0 mg/dL       Alkaline Phosphatase   Date Value Ref Range Status   03/27/2023 50 (L) 55 - 135 U/L Final     AST   Date Value Ref Range Status   03/27/2023 14 10 - 40 U/L Final     ALT   Date Value Ref Range Status   03/27/2023 16 10 - 44 U/L Final     Anion Gap   Date Value Ref Range Status   03/27/2023 10 8 - 16 mmol/L Final     Lab Results   Component Value Date    WBC 7.82 03/27/2023    HGB 13.6 03/27/2023    HCT 43.3 03/27/2023    MCV 95 03/27/2023     03/27/2023     Lab Results   Component Value Date    CHOL 196 03/27/2023    CHOL 177 01/30/2023    CHOL 181 10/20/2022     Lab Results   Component Value Date    HDL 62 03/27/2023    HDL 61 01/30/2023    HDL 60 10/20/2022     Lab Results   Component Value Date    LDLCALC 111.8 03/27/2023    LDLCALC 99.2 01/30/2023    LDLCALC 92.6 10/20/2022     Lab Results   Component Value Date    TRIG 111 03/27/2023    TRIG 84 01/30/2023    TRIG 142 10/20/2022     Lab Results   Component Value Date    CHOLHDL 31.6 03/27/2023    CHOLHDL 34.5 01/30/2023    CHOLHDL 33.1  10/20/2022     Lab Results   Component Value Date    TSH 1.542 03/27/2023       ASSESSMENT/PLAN     Toya Mendoza is a 53 y.o. female     COVID-19- having postcovid continued cough and shortness of breath and fatigue. Will send for labs and Ct of the chest. Reviewed CXR. Six minute walk on RA O2 sat 99-96%   -     CBC Auto Differential; Future; Expected date: 02/23/2024  -     Comprehensive Metabolic Panel; Future; Expected date: 02/23/2024  -     Sedimentation rate; Future; Expected date: 02/23/2024  -     C-Reactive Protein; Future; Expected date: 02/23/2024  -     B-TYPE NATRIURETIC PEPTIDE; Future; Expected date: 02/23/2024  -     D-DIMER, QUANTITATIVE; Future; Expected date: 02/23/2024  -     CT Chest Without Contrast; Future; Expected date: 02/23/2024    Shortness of breath- having postcovid continued cough and shortness of breath and fatigue. Will send for labs and Ct of the chest. Reviewed CXR. Six minute walk on RA O2 sat 99-96%   -     CBC Auto Differential; Future; Expected date: 02/23/2024  -     Comprehensive Metabolic Panel; Future; Expected date: 02/23/2024  -     Sedimentation rate; Future; Expected date: 02/23/2024  -     C-Reactive Protein; Future; Expected date: 02/23/2024  -     B-TYPE NATRIURETIC PEPTIDE; Future; Expected date: 02/23/2024  -     D-DIMER, QUANTITATIVE; Future; Expected date: 02/23/2024  -     CT Chest Without Contrast; Future; Expected date: 02/23/2024    Acute cough- having postcovid continued cough and shortness of breath and fatigue. Will send for labs and Ct of the chest. Reviewed CXR. Six minute walk on RA O2 sat 99-96%   -     CBC Auto Differential; Future; Expected date: 02/23/2024  -     Comprehensive Metabolic Panel; Future; Expected date: 02/23/2024  -     Sedimentation rate; Future; Expected date: 02/23/2024  -     C-Reactive Protein; Future; Expected date: 02/23/2024  -     B-TYPE NATRIURETIC PEPTIDE; Future; Expected date: 02/23/2024  -     D-DIMER,  QUANTITATIVE; Future; Expected date: 02/23/2024  -     CT Chest Without Contrast; Future; Expected date: 02/23/2024    Fatigue, unspecified type  -     CBC Auto Differential; Future; Expected date: 02/23/2024  -     Comprehensive Metabolic Panel; Future; Expected date: 02/23/2024  -     Sedimentation rate; Future; Expected date: 02/23/2024  -     C-Reactive Protein; Future; Expected date: 02/23/2024  -     B-TYPE NATRIURETIC PEPTIDE; Future; Expected date: 02/23/2024  -     D-DIMER, QUANTITATIVE; Future; Expected date: 02/23/2024    Major depression, chronic- stable. Will cont current meds and monitor     Anxiety stable. Will cont current meds and monitor     S/P laparoscopic sleeve gastrectomy- stable. Will monitor vitamin levels     Prediabetes- stable. Will monitor A1c     Primary osteoarthritis of right knee      Follow up with PCP    Patient education provided from [facility]. Patient was counseled on when and how to seek emergent care.       Cris HIGHTOWER, REFUGIO, FNP-c   Department of Internal Medicine - [facility]  9:44 AM    Answers submitted by the patient for this visit:  Diabetes Questionnaire (Submitted on 2/23/2024)  Chief Complaint: Diabetes problem  Diabetes type: type 2  MedicAlert ID: No  Disease duration: 20 Years  blurred vision: No  foot paresthesias: No  foot ulcerations: No  visual change: No  weight loss: No  Symptom course: stable  hunger: No  mood changes: No  sleepiness: No  sweats: No  blackouts: No  hospitalization: No  nocturnal hypoglycemia: No  required assistance: No  required glucagon: No  CVA: No  heart disease: No  nephropathy: No  peripheral neuropathy: No  PVD: No  retinopathy: No  autonomic neuropathy: No  CAD risks: family history, obesity, post-menopausal  Current treatments: oral agent (monotherapy)  Treatment compliance: all of the time  Monitoring compliance: good  Blood glucose trend: no change  Current diet: generally healthy  Meal planning: carbohydrate  counting  Exercise: intermittently  Dietitian visit: No  Eye exam current: Yes  Sees podiatrist: No

## 2024-02-27 RX ORDER — DOXYCYCLINE 100 MG/1
100 CAPSULE ORAL 2 TIMES DAILY
Qty: 14 CAPSULE | Refills: 0 | Status: SHIPPED | OUTPATIENT
Start: 2024-02-27

## 2024-03-11 ENCOUNTER — TELEPHONE (OUTPATIENT)
Dept: SLEEP MEDICINE | Facility: OTHER | Age: 54
End: 2024-03-11
Payer: COMMERCIAL

## 2024-03-11 NOTE — TELEPHONE ENCOUNTER
I left a message,talked to patient twice about scheduling the home sleep study.  Said she would call back due to being sick.  I sent out a reminder through my chart to schedule.

## 2024-04-03 ENCOUNTER — OFFICE VISIT (OUTPATIENT)
Dept: INTERNAL MEDICINE | Facility: CLINIC | Age: 54
End: 2024-04-03
Payer: COMMERCIAL

## 2024-04-03 VITALS
WEIGHT: 221.13 LBS | BODY MASS INDEX: 34.71 KG/M2 | DIASTOLIC BLOOD PRESSURE: 86 MMHG | SYSTOLIC BLOOD PRESSURE: 120 MMHG | OXYGEN SATURATION: 98 % | HEIGHT: 67 IN | HEART RATE: 88 BPM

## 2024-04-03 DIAGNOSIS — S83.281A ACUTE TEAR LATERAL MENISCUS, RIGHT, INITIAL ENCOUNTER: ICD-10-CM

## 2024-04-03 DIAGNOSIS — M17.11 PRIMARY OSTEOARTHRITIS OF RIGHT KNEE: ICD-10-CM

## 2024-04-03 DIAGNOSIS — Z98.84 S/P LAPAROSCOPIC SLEEVE GASTRECTOMY: ICD-10-CM

## 2024-04-03 DIAGNOSIS — F41.9 ANXIETY: ICD-10-CM

## 2024-04-03 DIAGNOSIS — U09.9 LONG COVID: Primary | ICD-10-CM

## 2024-04-03 DIAGNOSIS — E66.9 CLASS 1 OBESITY WITH BODY MASS INDEX (BMI) OF 34.0 TO 34.9 IN ADULT, UNSPECIFIED OBESITY TYPE, UNSPECIFIED WHETHER SERIOUS COMORBIDITY PRESENT: ICD-10-CM

## 2024-04-03 DIAGNOSIS — R73.03 PREDIABETES: ICD-10-CM

## 2024-04-03 PROCEDURE — 99214 OFFICE O/P EST MOD 30 MIN: CPT | Mod: S$GLB,,, | Performed by: NURSE PRACTITIONER

## 2024-04-03 PROCEDURE — 99999 PR PBB SHADOW E&M-EST. PATIENT-LVL IV: CPT | Mod: PBBFAC,,, | Performed by: NURSE PRACTITIONER

## 2024-04-03 PROCEDURE — 3079F DIAST BP 80-89 MM HG: CPT | Mod: CPTII,S$GLB,, | Performed by: NURSE PRACTITIONER

## 2024-04-03 PROCEDURE — 3074F SYST BP LT 130 MM HG: CPT | Mod: CPTII,S$GLB,, | Performed by: NURSE PRACTITIONER

## 2024-04-03 PROCEDURE — 1159F MED LIST DOCD IN RCRD: CPT | Mod: CPTII,S$GLB,, | Performed by: NURSE PRACTITIONER

## 2024-04-03 PROCEDURE — 3008F BODY MASS INDEX DOCD: CPT | Mod: CPTII,S$GLB,, | Performed by: NURSE PRACTITIONER

## 2024-04-03 RX ORDER — TRAMADOL HYDROCHLORIDE 50 MG/1
50 TABLET ORAL EVERY 6 HOURS PRN
Qty: 20 TABLET | Refills: 0 | Status: SHIPPED | OUTPATIENT
Start: 2024-04-03

## 2024-04-03 RX ORDER — ALPRAZOLAM 1 MG/1
1 TABLET ORAL NIGHTLY PRN
Qty: 30 TABLET | Refills: 0 | Status: SHIPPED | OUTPATIENT
Start: 2024-04-03

## 2024-04-03 RX ORDER — SEMAGLUTIDE 1.34 MG/ML
1 INJECTION, SOLUTION SUBCUTANEOUS
COMMUNITY
Start: 2024-03-22

## 2024-04-03 NOTE — PROGRESS NOTES
INTERNAL MEDICINE PROGRESS NOTE    CHIEF COMPLAINT     Chief Complaint   Patient presents with    COVID-19 Concerns     Long covid issues        HPI     Toya Mendoza is a 53 y.o. female with migraines who presents for a follow up visit today.    Here for follow up for long covid     Cardiopulmonary symptoms- have improved. Chest pressure has resolved. Cough has resolved     Fatigue- improved. Stamina has improved. Able to walk the length of the FQ without issues.     Still having trouble concentrating and brain fog   Forgetting plans   Headaches have resolved     Also having congestion - using flonase         Past Medical History:  Past Medical History:   Diagnosis Date    Anemia     Anxiety     Depression     Diabetes mellitus, type 2     Migraine        Home Medications:  Prior to Admission medications    Medication Sig Start Date End Date Taking? Authorizing Provider   acetaminophen (TYLENOL) 325 MG tablet Take 650 mg by mouth every 6 (six) hours as needed.   Yes Provider, Historical   ALPRAZolam (XANAX) 1 MG tablet Take 1 tablet (1 mg total) by mouth nightly as needed for Anxiety. 1/2/24  Yes Jaclyn Callahan MD   bimatoprost (LATISSE) 0.03 % ophthalmic solution Place 1 Application into both eyes every evening. Place one drop on applicator and apply evenly along the skin of the upper eyelid at base of eyelashes once daily at bedtime; repeat procedure for second eye (use a clean applicator). 2/5/24  Yes PretCris Bradshaw NP   celecoxib (CELEBREX) 100 MG capsule Take 1 capsule (100 mg total) by mouth 2 (two) times daily as needed for Pain. 2/5/24  Yes Cris Peralta NP   fluticasone propionate (FLONASE) 50 mcg/actuation nasal spray 1 spray (50 mcg total) by Each Nostril route once daily. 2/5/24  Yes PretusCris Jacob NP   multivit-min-iron-CA-FA 27-0.4 mg Tab Take 1 tablet by mouth.   Yes Provider, Historical   semaglutide (OZEMPIC) 1 mg/dose (2 mg/1.5 mL) PnIj Inject 1 mg into  the skin every 7 days. 24 Yes Jaclyn Callahan MD   sertraline (ZOLOFT) 100 MG tablet Take 1 tablet (100 mg total) by mouth once daily. 24  Yes Cris Peralta NP   traMADoL (ULTRAM) 50 mg tablet Take 1 tablet (50 mg total) by mouth every 6 (six) hours as needed for Pain. 1/3/24  Yes Marly Sawyer PA-C   cyanocobalamin (VITAMIN B-12) 1000 MCG tablet Take 1 tablet (1,000 mcg total) by mouth once daily. 23   Jaclyn Callahan MD   doxycycline (VIBRAMYCIN) 100 MG Cap Take 1 capsule (100 mg total) by mouth 2 (two) times daily.  Patient not taking: Reported on 4/3/2024 2/27/24   Cris Peralta NP   EPINEPHrine (EPIPEN) 0.3 mg/0.3 mL AtIn SMARTSI Pre-Filled Pen Syringe IM Once 21   Provider, Historical   OZEMPIC 1 mg/dose (4 mg/3 mL) Inject 1 mg into the skin every 7 days. 3/22/24   Provider, Historical       Review of Systems:  Review of Systems   Constitutional:  Negative for chills, fatigue, fever and unexpected weight change.   HENT:  Positive for congestion. Negative for hearing loss, rhinorrhea and sinus pressure.    Eyes:  Negative for pain, redness and visual disturbance.   Respiratory:  Negative for cough, shortness of breath and wheezing.    Cardiovascular:  Negative for chest pain and palpitations.   Gastrointestinal:  Negative for abdominal distention, abdominal pain, constipation, diarrhea, nausea and vomiting.   Endocrine: Negative for polydipsia, polyphagia and polyuria.   Genitourinary:  Negative for dysuria, frequency, urgency and vaginal discharge.   Musculoskeletal:  Negative for arthralgias, gait problem and myalgias.   Skin:  Negative for color change and rash.   Allergic/Immunologic: Negative for environmental allergies and immunocompromised state.   Neurological:  Negative for dizziness, weakness, light-headedness and headaches.   Hematological:  Negative for adenopathy. Does not bruise/bleed easily.   Psychiatric/Behavioral:  Positive for confusion  "and decreased concentration. Negative for sleep disturbance. The patient is not nervous/anxious.         Brain fog        Health Maintainence:   Immunizations:  Health Maintenance         Date Due Completion Date    COVID-19 Vaccine (9 - 2023-24 season) 09/01/2023 10/20/2022    Hemoglobin A1c (Prediabetes) 11/13/2024 11/13/2023    Mammogram 01/31/2025 1/31/2024    Lipid Panel 03/27/2028 3/27/2023    Colorectal Cancer Screening 05/17/2028 5/17/2023    TETANUS VACCINE 08/29/2033 8/29/2023             PHYSICAL EXAM     /86 (BP Location: Right arm, Patient Position: Sitting, BP Method: Medium (Manual))   Pulse 88   Ht 5' 7" (1.702 m)   Wt 100.3 kg (221 lb 1.9 oz)   SpO2 98%   BMI 34.63 kg/m²     Physical Exam  Vitals reviewed.   Constitutional:       Appearance: She is well-developed.   HENT:      Head: Normocephalic.      Right Ear: External ear normal.      Left Ear: External ear normal.      Nose: Nose normal.      Mouth/Throat:      Pharynx: No oropharyngeal exudate.   Eyes:      Extraocular Movements: Extraocular movements intact.      Pupils: Pupils are equal, round, and reactive to light.   Neck:      Thyroid: No thyromegaly.      Vascular: No JVD.      Trachea: No tracheal deviation.   Cardiovascular:      Rate and Rhythm: Normal rate and regular rhythm.      Heart sounds: Normal heart sounds. No murmur heard.     No friction rub. No gallop.   Pulmonary:      Effort: Pulmonary effort is normal. No respiratory distress.      Breath sounds: Normal breath sounds. No wheezing or rales.   Abdominal:      General: Bowel sounds are normal. There is no distension.      Palpations: Abdomen is soft.      Tenderness: There is no abdominal tenderness.   Musculoskeletal:         General: No tenderness. Normal range of motion.      Cervical back: Neck supple.   Lymphadenopathy:      Cervical: No cervical adenopathy.   Skin:     General: Skin is warm and dry.      Findings: No rash.   Neurological:      Mental " Status: She is alert and oriented to person, place, and time.      Cranial Nerves: No cranial nerve deficit (III-XII grossly intact).   Psychiatric:         Behavior: Behavior normal.         LABS     Lab Results   Component Value Date    HGBA1C 5.6 11/13/2023     CMP  Sodium   Date Value Ref Range Status   02/23/2024 142 136 - 145 mmol/L Final     Potassium   Date Value Ref Range Status   02/23/2024 4.4 3.5 - 5.1 mmol/L Final     Chloride   Date Value Ref Range Status   02/23/2024 105 95 - 110 mmol/L Final     CO2   Date Value Ref Range Status   02/23/2024 27 23 - 29 mmol/L Final     Glucose   Date Value Ref Range Status   02/23/2024 125 (H) 70 - 110 mg/dL Final     BUN   Date Value Ref Range Status   02/23/2024 10 6 - 20 mg/dL Final     Creatinine   Date Value Ref Range Status   02/23/2024 0.8 0.5 - 1.4 mg/dL Final     Calcium   Date Value Ref Range Status   02/23/2024 10.4 8.7 - 10.5 mg/dL Final     Total Protein   Date Value Ref Range Status   02/23/2024 6.5 6.0 - 8.4 g/dL Final     Albumin   Date Value Ref Range Status   02/23/2024 3.4 (L) 3.5 - 5.2 g/dL Final     Total Bilirubin   Date Value Ref Range Status   02/23/2024 0.4 0.1 - 1.0 mg/dL Final     Comment:     For infants and newborns, interpretation of results should be based  on gestational age, weight and in agreement with clinical  observations.    Premature Infant recommended reference ranges:  Up to 24 hours.............<8.0 mg/dL  Up to 48 hours............<12.0 mg/dL  3-5 days..................<15.0 mg/dL  6-29 days.................<15.0 mg/dL       Alkaline Phosphatase   Date Value Ref Range Status   02/23/2024 51 (L) 55 - 135 U/L Final     AST   Date Value Ref Range Status   02/23/2024 19 10 - 40 U/L Final     ALT   Date Value Ref Range Status   02/23/2024 18 10 - 44 U/L Final     Anion Gap   Date Value Ref Range Status   02/23/2024 10 8 - 16 mmol/L Final     Lab Results   Component Value Date    WBC 9.62 02/23/2024    HGB 14.6 02/23/2024    HCT  44.9 02/23/2024    MCV 90 02/23/2024     02/23/2024     Lab Results   Component Value Date    CHOL 196 03/27/2023    CHOL 177 01/30/2023    CHOL 181 10/20/2022     Lab Results   Component Value Date    HDL 62 03/27/2023    HDL 61 01/30/2023    HDL 60 10/20/2022     Lab Results   Component Value Date    LDLCALC 111.8 03/27/2023    LDLCALC 99.2 01/30/2023    LDLCALC 92.6 10/20/2022     Lab Results   Component Value Date    TRIG 111 03/27/2023    TRIG 84 01/30/2023    TRIG 142 10/20/2022     Lab Results   Component Value Date    CHOLHDL 31.6 03/27/2023    CHOLHDL 34.5 01/30/2023    CHOLHDL 33.1 10/20/2022     Lab Results   Component Value Date    TSH 1.542 03/27/2023       ASSESSMENT/PLAN     Toya Mendoza is a 53 y.o. female     Long COVID- symptoms improving. Will cont to increase conditioning and will monitor neurocognitive symptoms.    Anxiety- stable. Will cont xanax sparingly   -     ALPRAZolam (XANAX) 1 MG tablet; Take 1 tablet (1 mg total) by mouth nightly as needed for Anxiety.  Dispense: 30 tablet; Refill: 0    Acute tear lateral meniscus, right, initial encounter- stable. Will cont tramadol sparingly   -     traMADoL (ULTRAM) 50 mg tablet; Take 1 tablet (50 mg total) by mouth every 6 (six) hours as needed for Pain.  Dispense: 20 tablet; Refill: 0    Primary osteoarthritis of right knee- stable. Will cont tramadol sparingly     S/P laparoscopic sleeve gastrectomy    Prediabetes    Class 1 obesity with body mass index (BMI) of 34.0 to 34.9 in adult, unspecified obesity type, unspecified whether serious comorbidity present           Follow up with PCP \    Patient education provided from [facility]. Patient was counseled on when and how to seek emergent care.       Cris Peralta MN, APRN, FNP-c   Department of Internal Medicine - [facility]  2:45 PM

## 2024-04-05 PROBLEM — E66.811 CLASS 1 OBESITY WITH BODY MASS INDEX (BMI) OF 34.0 TO 34.9 IN ADULT: Status: ACTIVE | Noted: 2024-01-02

## 2024-04-05 PROBLEM — E66.9 CLASS 1 OBESITY WITH BODY MASS INDEX (BMI) OF 34.0 TO 34.9 IN ADULT: Status: ACTIVE | Noted: 2024-01-02

## 2024-08-15 ENCOUNTER — HOSPITAL ENCOUNTER (EMERGENCY)
Facility: OTHER | Age: 54
Discharge: HOME | End: 2024-08-15
Attending: EMERGENCY MEDICINE
Payer: COMMERCIAL

## 2024-08-15 VITALS
TEMPERATURE: 99 F | BODY MASS INDEX: 32.96 KG/M2 | HEART RATE: 72 BPM | WEIGHT: 210 LBS | DIASTOLIC BLOOD PRESSURE: 58 MMHG | HEIGHT: 67 IN | RESPIRATION RATE: 19 BRPM | SYSTOLIC BLOOD PRESSURE: 130 MMHG | OXYGEN SATURATION: 100 %

## 2024-08-15 DIAGNOSIS — R55 SYNCOPE: ICD-10-CM

## 2024-08-15 DIAGNOSIS — R55 VASOVAGAL SYNCOPE: Primary | ICD-10-CM

## 2024-08-15 LAB
ALBUMIN SERPL BCP-MCNC: 3.6 G/DL (ref 3.5–5.2)
ALP SERPL-CCNC: 47 U/L (ref 55–135)
ALT SERPL W/O P-5'-P-CCNC: 14 U/L (ref 10–44)
ANION GAP SERPL CALC-SCNC: 9 MMOL/L (ref 8–16)
AST SERPL-CCNC: 15 U/L (ref 10–40)
BASOPHILS # BLD AUTO: 0.02 K/UL (ref 0–0.2)
BASOPHILS NFR BLD: 0.2 % (ref 0–1.9)
BILIRUB SERPL-MCNC: 0.6 MG/DL (ref 0.1–1)
BUN SERPL-MCNC: 12 MG/DL (ref 6–20)
CALCIUM SERPL-MCNC: 9.1 MG/DL (ref 8.7–10.5)
CHLORIDE SERPL-SCNC: 105 MMOL/L (ref 95–110)
CO2 SERPL-SCNC: 24 MMOL/L (ref 23–29)
CREAT SERPL-MCNC: 0.8 MG/DL (ref 0.5–1.4)
DIFFERENTIAL METHOD BLD: NORMAL
EOSINOPHIL # BLD AUTO: 0.2 K/UL (ref 0–0.5)
EOSINOPHIL NFR BLD: 3 % (ref 0–8)
ERYTHROCYTE [DISTWIDTH] IN BLOOD BY AUTOMATED COUNT: 13.2 % (ref 11.5–14.5)
EST. GFR  (NO RACE VARIABLE): >60 ML/MIN/1.73 M^2
GLUCOSE SERPL-MCNC: 128 MG/DL (ref 70–110)
HCT VFR BLD AUTO: 38.4 % (ref 37–48.5)
HGB BLD-MCNC: 12.5 G/DL (ref 12–16)
IMM GRANULOCYTES # BLD AUTO: 0.02 K/UL (ref 0–0.04)
IMM GRANULOCYTES NFR BLD AUTO: 0.2 % (ref 0–0.5)
LYMPHOCYTES # BLD AUTO: 1.9 K/UL (ref 1–4.8)
LYMPHOCYTES NFR BLD: 24.2 % (ref 18–48)
MCH RBC QN AUTO: 30.1 PG (ref 27–31)
MCHC RBC AUTO-ENTMCNC: 32.6 G/DL (ref 32–36)
MCV RBC AUTO: 93 FL (ref 82–98)
MONOCYTES # BLD AUTO: 0.4 K/UL (ref 0.3–1)
MONOCYTES NFR BLD: 5.1 % (ref 4–15)
NEUTROPHILS # BLD AUTO: 5.4 K/UL (ref 1.8–7.7)
NEUTROPHILS NFR BLD: 67.3 % (ref 38–73)
NRBC BLD-RTO: 0 /100 WBC
OHS QRS DURATION: 130 MS
OHS QTC CALCULATION: 443 MS
PLATELET # BLD AUTO: 261 K/UL (ref 150–450)
PMV BLD AUTO: 9.4 FL (ref 9.2–12.9)
POTASSIUM SERPL-SCNC: 3.9 MMOL/L (ref 3.5–5.1)
PROT SERPL-MCNC: 6.5 G/DL (ref 6–8.4)
RBC # BLD AUTO: 4.15 M/UL (ref 4–5.4)
SODIUM SERPL-SCNC: 138 MMOL/L (ref 136–145)
WBC # BLD AUTO: 8.03 K/UL (ref 3.9–12.7)

## 2024-08-15 PROCEDURE — 80053 COMPREHEN METABOLIC PANEL: CPT | Performed by: EMERGENCY MEDICINE

## 2024-08-15 PROCEDURE — 93005 ELECTROCARDIOGRAM TRACING: CPT

## 2024-08-15 PROCEDURE — 85025 COMPLETE CBC W/AUTO DIFF WBC: CPT | Performed by: EMERGENCY MEDICINE

## 2024-08-15 PROCEDURE — 99284 EMERGENCY DEPT VISIT MOD MDM: CPT | Mod: 25

## 2024-08-15 PROCEDURE — 25000003 PHARM REV CODE 250: Performed by: EMERGENCY MEDICINE

## 2024-08-15 PROCEDURE — 93010 ELECTROCARDIOGRAM REPORT: CPT | Mod: ,,, | Performed by: INTERNAL MEDICINE

## 2024-08-15 RX ADMIN — SODIUM CHLORIDE 1000 ML: 0.9 INJECTION, SOLUTION INTRAVENOUS at 11:08

## 2024-08-15 NOTE — ED TRIAGE NOTES
BIB EMS after witnessed syncopal episode during breakfast at ~9am. Pt AAOx4; endorses feeling dizzy prior to episode. Per pt witness reports episode lasted 3 seconds. Denies fall/trauma, change in vision, HA, hx synope; states dizziness has resolved. Given ~800cc NS PTA per EMS.

## 2024-08-15 NOTE — ED PROVIDER NOTES
Encounter Date: 8/15/2024       History     Chief Complaint   Patient presents with    Loss of Consciousness     Witnessed syncopal episode while eating breakfast PTA. Pt endorses feeling dizzy before LOC. Initial BP 82/54. Denies head trauma.     53-year-old female presents emergency department following a witnessed syncopal episode while at breakfast.  Patient reports she has been feeling congested with some seasonal allergy symptoms since returning to [place], she has been spending the past couple of days at home doing things around the house but not eating or drinking much.  Remote history of diabetes which resolved after patient lost 100 lbs.  Denies chest pain, headache.  Patient reports feeling weak and ate about 2 bites of food and told her friend she was not feeling well needs to leave and then he supported her as she lost consciousness.  She was pale and diaphoretic and EMS noted hypotension.  Upon arrival in the emergency department patient had improvement of symptoms.      The history is provided by the patient.     Review of patient's allergies indicates:   Allergen Reactions    Codeine Nausea Only     nausea    Augmentin [amoxicillin-pot clavulanate] Nausea And Vomiting     Past Medical History:   Diagnosis Date    Anemia     Anxiety     Depression     Diabetes mellitus, type 2     Migraine      Past Surgical History:   Procedure Laterality Date    ADENOIDECTOMY       SECTION      x 2    CHOLECYSTECTOMY      COLONOSCOPY N/A 2023    Procedure: COLONOSCOPY;  Surgeon: Pierce Silva MD;  Location: [facility] ENDOSCOPY;  Service: Endoscopy;  Laterality: N/A;  referred by Cris Egan NP for abdominal pain and gi hemorrhage unspecified suprep instructions sent to pt portal.cf   pre-call attempted; no answer left message; MS    COSMETIC SURGERY      EYE SURGERY      HYSTERECTOMY      LAPAROSCOPIC GASTRIC BANDING  2010    OOPHORECTOMY      TONSILLECTOMY       Family History    Problem Relation Name Age of Onset    Cancer Mother Francia Kam         kidney    Diabetes Mother Francia Kam     Arthritis Mother Francia Kam     Depression Mother Francia Kam     Thyroid disease Father Kodak Newman     Heart disease Father Kodak Newman         MI    Alcohol abuse Father Kodak Newman     Ovarian cancer Sister      Cancer Sister          cervical    No Known Problems Daughter      Diabetes Maternal Grandmother      Diabetes Paternal Grandmother      Cancer Brother  50        colon    Diabetes Brother      No Known Problems Son       Social History     Tobacco Use    Smoking status: Light Smoker     Current packs/day: 0.25     Average packs/day: 0.3 packs/day for 2.0 years (0.5 ttl pk-yrs)     Types: Cigarettes    Smokeless tobacco: Never   Substance Use Topics    Alcohol use: Yes     Alcohol/week: 8.0 standard drinks of alcohol     Types: 4 Glasses of wine, 4 Drinks containing 0.5 oz of alcohol per week     Comment: 8-14 drinks per week    Drug use: Yes     Frequency: 2.0 times per week     Types: Marijuana     Comment: for sleep - insomnia - edible     Review of Systems   Constitutional:  Negative for fever.   HENT:  Positive for congestion, sinus pressure and sneezing. Negative for sore throat.    Respiratory:  Negative for shortness of breath.    Cardiovascular:  Negative for chest pain.   Gastrointestinal:  Negative for nausea.   Genitourinary:  Negative for dysuria.   Musculoskeletal:  Negative for back pain.   Skin:  Negative for color change, rash and wound.   Neurological:  Positive for syncope. Negative for weakness.   Hematological:  Does not bruise/bleed easily.   All other systems reviewed and are negative.      Physical Exam     Initial Vitals [08/15/24 1020]   BP Pulse Resp Temp SpO2   117/65 85 16 98.6 °F (37 °C) 100 %      MAP       --         Physical Exam    Nursing note and vitals reviewed.  Constitutional: She appears well-developed and well-nourished. No distress.    HENT:   Head: Normocephalic and atraumatic.   Right Ear: External ear normal.   Left Ear: External ear normal.   Eyes: Conjunctivae and EOM are normal. Pupils are equal, round, and reactive to light. Right eye exhibits no discharge. Left eye exhibits no discharge. No scleral icterus.   Neck: Neck supple.   Normal range of motion.  Cardiovascular:  Normal rate, regular rhythm and normal heart sounds.     Exam reveals no gallop and no friction rub.       No murmur heard.  Pulmonary/Chest: Breath sounds normal. No stridor. No respiratory distress. She has no wheezes. She has no rhonchi. She has no rales.   Abdominal: Abdomen is soft. Bowel sounds are normal. She exhibits no distension and no mass. There is no abdominal tenderness. There is no rebound and no guarding.   Musculoskeletal:         General: No edema. Normal range of motion.      Cervical back: Normal range of motion and neck supple.     Neurological: She is alert and oriented to person, place, and time. She has normal strength. No cranial nerve deficit or sensory deficit. GCS score is 15. GCS eye subscore is 4. GCS verbal subscore is 5. GCS motor subscore is 6.   Skin: Skin is warm and dry. Capillary refill takes less than 2 seconds.   Psychiatric: She has a normal mood and affect. Her behavior is normal. Judgment and thought content normal.         ED Course   Procedures  Labs Reviewed   COMPREHENSIVE METABOLIC PANEL - Abnormal       Result Value    Sodium 138      Potassium 3.9      Chloride 105      CO2 24      Glucose 128 (*)     BUN 12      Creatinine 0.8      Calcium 9.1      Total Protein 6.5      Albumin 3.6      Total Bilirubin 0.6      Alkaline Phosphatase 47 (*)     AST 15      ALT 14      eGFR >60      Anion Gap 9     CBC W/ AUTO DIFFERENTIAL    WBC 8.03      RBC 4.15      Hemoglobin 12.5      Hematocrit 38.4      MCV 93      MCH 30.1      MCHC 32.6      RDW 13.2      Platelets 261      MPV 9.4      Immature Granulocytes 0.2      Gran # (ANC)  5.4      Immature Grans (Abs) 0.02      Lymph # 1.9      Mono # 0.4      Eos # 0.2      Baso # 0.02      nRBC 0      Gran % 67.3      Lymph % 24.2      Mono % 5.1      Eosinophil % 3.0      Basophil % 0.2      Differential Method Automated     SARS-COV-2 RDRP GENE     EKG Readings: (Independently Interpreted)   Initial Reading: No STEMI. Previous EKG: Compared with most recent EKG Heart Rate: 65. Conduction: RBBB. ST Segments: Normal ST Segments.     ECG Results              EKG 12-lead (Final result)        Collection Time Result Time QRS Duration OHS QTC Calculation    08/15/24 10:38:40 08/15/24 14:48:52 130 443                     Final result by Interface, Lab In [facility] (08/15/24 14:48:56)                   Narrative:    Test Reason : R55,    Vent. Rate : 065 BPM     Atrial Rate : 065 BPM     P-R Int : 154 ms          QRS Dur : 130 ms      QT Int : 426 ms       P-R-T Axes : 044 -04 012 degrees     QTc Int : 443 ms    Normal sinus rhythm  Right bundle branch block  Abnormal ECG    Confirmed by Pancho ARZATE, Mesfin HENRY ([id number]) on 8/15/2024 2:48:47 PM    Referred By: [email or web address]   SELF           Confirmed By:Mesfin Deleon MD                                  Imaging Results    None          Medications   sodium chloride 0.9% bolus 1,000 mL 1,000 mL (1,000 mLs Intravenous New Bag 8/15/24 9885)     Medical Decision Making  53-year-old female presents emergency department following syncopal episode.  Suspect patient does have enough to eat or drink in the past couple of days.  No DVT or PE risk factors.  EKG reassuring with right bundle-branch, no evidence of ischemia.  Plan basic labs, IV fluid.       Differential diagnosis includes, but is not limited to:  Arrhythmia, situational, vasovagal, cerebrovascular, neurogenic, psychogenic, drug-induced, autonomic failure, dehydration.     Considered but not suspected at this time:  Obstructive cardiomyopathy, structural cardiac disease, aortic dissection, PE, pulmonary  hypertension, carotid sinus syndrome        Amount and/or Complexity of Data Reviewed  Labs: ordered.                     Patient improved with treatment in the emergency department and comfortable going home. Discussed reasons to return and importance of followup.  Patient understands that the emergency visit today is primarily to address immediate concerns and to rule out emergent cause of symptoms and that they may require further workup and evaluation as an outpatient. All questions addressed and patient given discharge instructions and followup information.                  Clinical Impression:  Final diagnoses:  [R55] Syncope  [R55] Vasovagal syncope (Primary)          ED Disposition Condition    Discharge Stable          ED Prescriptions    None       Follow-up Information       Follow up With Specialties Details Why Contact Info Additional Information    [facility] Internal Medicine Internal Medicine Schedule an appointment as soon as possible for a visit   [address] [facility] [address] [phone] Turn at Entrance [address] on [facility] in [facility] and take elevators to Floor 2. Follow signs to [facility]. Take [place] Elevators to Floor 8 for [address].             Mara Nance MD  08/15/24 7000

## 2024-10-09 ENCOUNTER — PATIENT MESSAGE (OUTPATIENT)
Dept: INTERNAL MEDICINE | Facility: CLINIC | Age: 54
End: 2024-10-09
Payer: COMMERCIAL

## 2024-10-09 ENCOUNTER — PATIENT MESSAGE (OUTPATIENT)
Dept: ORTHOPEDICS | Facility: CLINIC | Age: 54
End: 2024-10-09
Payer: COMMERCIAL

## 2024-10-09 DIAGNOSIS — E66.811 CLASS 1 OBESITY WITH BODY MASS INDEX (BMI) OF 34.0 TO 34.9 IN ADULT, UNSPECIFIED OBESITY TYPE, UNSPECIFIED WHETHER SERIOUS COMORBIDITY PRESENT: ICD-10-CM

## 2024-10-09 DIAGNOSIS — Z00.00 ANNUAL PHYSICAL EXAM: Primary | ICD-10-CM

## 2024-10-09 DIAGNOSIS — M25.561 PAIN IN BOTH KNEES, UNSPECIFIED CHRONICITY: Primary | ICD-10-CM

## 2024-10-09 DIAGNOSIS — R73.03 PREDIABETES: ICD-10-CM

## 2024-10-09 DIAGNOSIS — M25.562 PAIN IN BOTH KNEES, UNSPECIFIED CHRONICITY: Primary | ICD-10-CM

## 2024-10-09 DIAGNOSIS — E53.8 B12 DEFICIENCY: ICD-10-CM

## 2024-10-09 DIAGNOSIS — M25.522 LEFT ELBOW PAIN: ICD-10-CM

## 2024-10-09 DIAGNOSIS — E11.9 TYPE 2 DIABETES MELLITUS WITHOUT COMPLICATION, WITHOUT LONG-TERM CURRENT USE OF INSULIN: ICD-10-CM

## 2024-10-15 ENCOUNTER — HOSPITAL ENCOUNTER (OUTPATIENT)
Dept: RADIOLOGY | Facility: HOSPITAL | Age: 54
Discharge: HOME | End: 2024-10-15
Attending: PHYSICIAN ASSISTANT
Payer: COMMERCIAL

## 2024-10-15 DIAGNOSIS — M25.522 LEFT ELBOW PAIN: ICD-10-CM

## 2024-10-15 DIAGNOSIS — M25.562 PAIN IN BOTH KNEES, UNSPECIFIED CHRONICITY: ICD-10-CM

## 2024-10-15 DIAGNOSIS — M25.561 PAIN IN BOTH KNEES, UNSPECIFIED CHRONICITY: ICD-10-CM

## 2024-10-15 PROCEDURE — 73080 X-RAY EXAM OF ELBOW: CPT | Mod: 26,LT,, | Performed by: RADIOLOGY

## 2024-10-15 PROCEDURE — 73562 X-RAY EXAM OF KNEE 3: CPT | Mod: TC,50

## 2024-10-15 PROCEDURE — 73080 X-RAY EXAM OF ELBOW: CPT | Mod: TC,LT

## 2024-10-15 PROCEDURE — 73562 X-RAY EXAM OF KNEE 3: CPT | Mod: 26,,, | Performed by: RADIOLOGY

## 2024-10-16 ENCOUNTER — LAB VISIT (OUTPATIENT)
Dept: LAB | Facility: HOSPITAL | Age: 54
End: 2024-10-16
Payer: COMMERCIAL

## 2024-10-16 DIAGNOSIS — E66.811 CLASS 1 OBESITY WITH BODY MASS INDEX (BMI) OF 34.0 TO 34.9 IN ADULT, UNSPECIFIED OBESITY TYPE, UNSPECIFIED WHETHER SERIOUS COMORBIDITY PRESENT: ICD-10-CM

## 2024-10-16 DIAGNOSIS — Z00.00 ANNUAL PHYSICAL EXAM: ICD-10-CM

## 2024-10-16 DIAGNOSIS — E53.8 B12 DEFICIENCY: ICD-10-CM

## 2024-10-16 DIAGNOSIS — R73.03 PREDIABETES: ICD-10-CM

## 2024-10-16 DIAGNOSIS — E11.9 TYPE 2 DIABETES MELLITUS WITHOUT COMPLICATION, WITHOUT LONG-TERM CURRENT USE OF INSULIN: ICD-10-CM

## 2024-10-16 LAB
25(OH)D3+25(OH)D2 SERPL-MCNC: 37 NG/ML (ref 30–96)
ALBUMIN SERPL BCP-MCNC: 4 G/DL (ref 3.5–5.2)
ALP SERPL-CCNC: 52 U/L (ref 55–135)
ALT SERPL W/O P-5'-P-CCNC: 17 U/L (ref 10–44)
ANION GAP SERPL CALC-SCNC: 11 MMOL/L (ref 8–16)
AST SERPL-CCNC: 16 U/L (ref 10–40)
BASOPHILS # BLD AUTO: 0.05 K/UL (ref 0–0.2)
BASOPHILS NFR BLD: 0.8 % (ref 0–1.9)
BILIRUB SERPL-MCNC: 0.3 MG/DL (ref 0.1–1)
BUN SERPL-MCNC: 11 MG/DL (ref 6–20)
CALCIUM SERPL-MCNC: 9.6 MG/DL (ref 8.7–10.5)
CHLORIDE SERPL-SCNC: 108 MMOL/L (ref 95–110)
CHOLEST SERPL-MCNC: 174 MG/DL (ref 120–199)
CHOLEST/HDLC SERPL: 3.8 {RATIO} (ref 2–5)
CO2 SERPL-SCNC: 24 MMOL/L (ref 23–29)
CREAT SERPL-MCNC: 0.8 MG/DL (ref 0.5–1.4)
DIFFERENTIAL METHOD BLD: ABNORMAL
EOSINOPHIL # BLD AUTO: 0.2 K/UL (ref 0–0.5)
EOSINOPHIL NFR BLD: 3.4 % (ref 0–8)
ERYTHROCYTE [DISTWIDTH] IN BLOOD BY AUTOMATED COUNT: 13.1 % (ref 11.5–14.5)
EST. GFR  (NO RACE VARIABLE): >60 ML/MIN/1.73 M^2
ESTIMATED AVG GLUCOSE: 120 MG/DL (ref 68–131)
GLUCOSE SERPL-MCNC: 83 MG/DL (ref 70–110)
HBA1C MFR BLD: 5.8 % (ref 4–5.6)
HCT VFR BLD AUTO: 43.1 % (ref 37–48.5)
HDLC SERPL-MCNC: 46 MG/DL (ref 40–75)
HDLC SERPL: 26.4 % (ref 20–50)
HGB BLD-MCNC: 14.4 G/DL (ref 12–16)
IMM GRANULOCYTES # BLD AUTO: 0.01 K/UL (ref 0–0.04)
IMM GRANULOCYTES NFR BLD AUTO: 0.2 % (ref 0–0.5)
LDLC SERPL CALC-MCNC: 89 MG/DL (ref 63–159)
LYMPHOCYTES # BLD AUTO: 3.2 K/UL (ref 1–4.8)
LYMPHOCYTES NFR BLD: 48.9 % (ref 18–48)
MCH RBC QN AUTO: 29.9 PG (ref 27–31)
MCHC RBC AUTO-ENTMCNC: 33.4 G/DL (ref 32–36)
MCV RBC AUTO: 89 FL (ref 82–98)
MONOCYTES # BLD AUTO: 0.4 K/UL (ref 0.3–1)
MONOCYTES NFR BLD: 6.3 % (ref 4–15)
NEUTROPHILS # BLD AUTO: 2.6 K/UL (ref 1.8–7.7)
NEUTROPHILS NFR BLD: 40.4 % (ref 38–73)
NONHDLC SERPL-MCNC: 128 MG/DL
NRBC BLD-RTO: 0 /100 WBC
PLATELET # BLD AUTO: 318 K/UL (ref 150–450)
PMV BLD AUTO: 9.8 FL (ref 9.2–12.9)
POTASSIUM SERPL-SCNC: 4.4 MMOL/L (ref 3.5–5.1)
PROT SERPL-MCNC: 7.2 G/DL (ref 6–8.4)
RBC # BLD AUTO: 4.82 M/UL (ref 4–5.4)
SODIUM SERPL-SCNC: 143 MMOL/L (ref 136–145)
TRIGL SERPL-MCNC: 195 MG/DL (ref 30–150)
TSH SERPL DL<=0.005 MIU/L-ACNC: 1.26 UIU/ML (ref 0.4–4)
WBC # BLD AUTO: 6.48 K/UL (ref 3.9–12.7)

## 2024-10-16 PROCEDURE — 82607 VITAMIN B-12: CPT | Performed by: NURSE PRACTITIONER

## 2024-10-16 PROCEDURE — 80053 COMPREHEN METABOLIC PANEL: CPT | Performed by: NURSE PRACTITIONER

## 2024-10-16 PROCEDURE — 82306 VITAMIN D 25 HYDROXY: CPT | Performed by: NURSE PRACTITIONER

## 2024-10-16 PROCEDURE — 85025 COMPLETE CBC W/AUTO DIFF WBC: CPT | Performed by: NURSE PRACTITIONER

## 2024-10-16 PROCEDURE — 86340 INTRINSIC FACTOR ANTIBODY: CPT | Performed by: NURSE PRACTITIONER

## 2024-10-16 PROCEDURE — 82941 ASSAY OF GASTRIN: CPT | Performed by: NURSE PRACTITIONER

## 2024-10-16 PROCEDURE — 83036 HEMOGLOBIN GLYCOSYLATED A1C: CPT | Performed by: NURSE PRACTITIONER

## 2024-10-16 PROCEDURE — 84443 ASSAY THYROID STIM HORMONE: CPT | Performed by: NURSE PRACTITIONER

## 2024-10-16 PROCEDURE — 80061 LIPID PANEL: CPT | Performed by: NURSE PRACTITIONER

## 2024-10-17 ENCOUNTER — IMMUNIZATION (OUTPATIENT)
Dept: INTERNAL MEDICINE | Facility: CLINIC | Age: 54
End: 2024-10-17
Payer: COMMERCIAL

## 2024-10-17 ENCOUNTER — OFFICE VISIT (OUTPATIENT)
Dept: INTERNAL MEDICINE | Facility: CLINIC | Age: 54
End: 2024-10-17
Payer: COMMERCIAL

## 2024-10-17 ENCOUNTER — OFFICE VISIT (OUTPATIENT)
Dept: ORTHOPEDICS | Facility: CLINIC | Age: 54
End: 2024-10-17
Payer: COMMERCIAL

## 2024-10-17 VITALS
BODY MASS INDEX: 36.13 KG/M2 | DIASTOLIC BLOOD PRESSURE: 82 MMHG | HEART RATE: 98 BPM | WEIGHT: 230.19 LBS | HEIGHT: 67 IN | SYSTOLIC BLOOD PRESSURE: 132 MMHG | OXYGEN SATURATION: 98 %

## 2024-10-17 DIAGNOSIS — Z23 NEED FOR VACCINATION: Primary | ICD-10-CM

## 2024-10-17 DIAGNOSIS — L92.9 GRANULOMA, SKIN: ICD-10-CM

## 2024-10-17 DIAGNOSIS — F32.9 MAJOR DEPRESSION, CHRONIC: ICD-10-CM

## 2024-10-17 DIAGNOSIS — R73.03 PREDIABETES: Primary | ICD-10-CM

## 2024-10-17 DIAGNOSIS — Z98.84 S/P LAPAROSCOPIC SLEEVE GASTRECTOMY: ICD-10-CM

## 2024-10-17 DIAGNOSIS — F41.9 ANXIETY: ICD-10-CM

## 2024-10-17 DIAGNOSIS — M77.12 LATERAL EPICONDYLITIS OF LEFT ELBOW: Primary | ICD-10-CM

## 2024-10-17 DIAGNOSIS — M17.11 PRIMARY OSTEOARTHRITIS OF RIGHT KNEE: ICD-10-CM

## 2024-10-17 DIAGNOSIS — E78.1 HYPERTRIGLYCERIDEMIA: ICD-10-CM

## 2024-10-17 LAB
ANNOTATION COMMENT IMP: NORMAL
IF BLOCK AB SER QL: NEGATIVE
VIT B12 SERPL-MCNC: 92 NG/L (ref 180–914)

## 2024-10-17 PROCEDURE — 90480 ADMN SARSCOV2 VAC 1/ONLY CMP: CPT | Mod: S$GLB,,, | Performed by: INTERNAL MEDICINE

## 2024-10-17 PROCEDURE — 90656 IIV3 VACC NO PRSV 0.5 ML IM: CPT | Mod: S$GLB,,, | Performed by: INTERNAL MEDICINE

## 2024-10-17 PROCEDURE — 99214 OFFICE O/P EST MOD 30 MIN: CPT | Mod: S$GLB,,, | Performed by: NURSE PRACTITIONER

## 2024-10-17 PROCEDURE — 90471 IMMUNIZATION ADMIN: CPT | Mod: S$GLB,,, | Performed by: INTERNAL MEDICINE

## 2024-10-17 PROCEDURE — 3044F HG A1C LEVEL LT 7.0%: CPT | Mod: CPTII,S$GLB,, | Performed by: PHYSICIAN ASSISTANT

## 2024-10-17 PROCEDURE — 99999 PR PBB SHADOW E&M-EST. PATIENT-LVL III: CPT | Mod: PBBFAC,,, | Performed by: NURSE PRACTITIONER

## 2024-10-17 PROCEDURE — 3075F SYST BP GE 130 - 139MM HG: CPT | Mod: CPTII,S$GLB,, | Performed by: NURSE PRACTITIONER

## 2024-10-17 PROCEDURE — 3044F HG A1C LEVEL LT 7.0%: CPT | Mod: CPTII,S$GLB,, | Performed by: NURSE PRACTITIONER

## 2024-10-17 PROCEDURE — 99214 OFFICE O/P EST MOD 30 MIN: CPT | Mod: S$GLB,,, | Performed by: PHYSICIAN ASSISTANT

## 2024-10-17 PROCEDURE — 99999 PR PBB SHADOW E&M-EST. PATIENT-LVL II: CPT | Mod: PBBFAC,,, | Performed by: PHYSICIAN ASSISTANT

## 2024-10-17 PROCEDURE — 1159F MED LIST DOCD IN RCRD: CPT | Mod: CPTII,S$GLB,, | Performed by: PHYSICIAN ASSISTANT

## 2024-10-17 PROCEDURE — 1160F RVW MEDS BY RX/DR IN RCRD: CPT | Mod: CPTII,S$GLB,, | Performed by: PHYSICIAN ASSISTANT

## 2024-10-17 PROCEDURE — 1159F MED LIST DOCD IN RCRD: CPT | Mod: CPTII,S$GLB,, | Performed by: NURSE PRACTITIONER

## 2024-10-17 PROCEDURE — 91320 SARSCV2 VAC 30MCG TRS-SUC IM: CPT | Mod: S$GLB,,, | Performed by: INTERNAL MEDICINE

## 2024-10-17 PROCEDURE — 3079F DIAST BP 80-89 MM HG: CPT | Mod: CPTII,S$GLB,, | Performed by: NURSE PRACTITIONER

## 2024-10-17 PROCEDURE — 3008F BODY MASS INDEX DOCD: CPT | Mod: CPTII,S$GLB,, | Performed by: NURSE PRACTITIONER

## 2024-10-17 RX ORDER — CELECOXIB 200 MG/1
200 CAPSULE ORAL DAILY
Qty: 30 CAPSULE | Refills: 0 | Status: SHIPPED | OUTPATIENT
Start: 2024-10-17 | End: 2024-11-16

## 2024-10-17 RX ORDER — TRIAMCINOLONE ACETONIDE 1 MG/G
OINTMENT TOPICAL 2 TIMES DAILY
Qty: 30 G | Refills: 1 | Status: SHIPPED | OUTPATIENT
Start: 2024-10-17

## 2024-10-17 NOTE — PROGRESS NOTES
Chief Complaint & History of Present Illness:  Toya Mendoza is a 53 y.o. year old female presenting with constant left elbow pain which started about 2 months ago.  She is right hand dominant. She was reaching out for a box and when she pulled her arm in she felt a pull in her elbow.  The pain is located on the lateral and anterior aspect of the elbow.  The pain is described as achy.  It is aggravated by  reaching, lifting, grasping .   Previous treatments include  rest  which have provided minimal relief.  There is not a history of previous injury or surgery to the elbow.      Review of patient's allergies indicates:   Allergen Reactions    Codeine Nausea Only     nausea    Augmentin [amoxicillin-pot clavulanate] Nausea And Vomiting         Current Outpatient Medications   Medication Sig Dispense Refill    semaglutide, weight loss, 0.5 mg/0.5 mL PnIj Inject 0.5 mg into the skin every 7 days. 0.5 mL 1    triamcinolone acetonide 0.1% (KENALOG) 0.1 % ointment Apply topically 2 (two) times daily. 30 g 1     Current Facility-Administered Medications   Medication Dose Route Frequency Provider Last Rate Last Admin    cyanocobalamin injection 1,000 mcg  1,000 mcg Intramuscular Q30 Days Jignesh-Cris Weller NP   1,000 mcg at 23 1455       Past Medical History:   Diagnosis Date    Anemia     Anxiety     Depression     Diabetes mellitus, type 2     Hypertriglyceridemia 10/17/2024    Migraine        Past Surgical History:   Procedure Laterality Date    ADENOIDECTOMY       SECTION      x 2    CHOLECYSTECTOMY      COLONOSCOPY N/A 2023    Procedure: COLONOSCOPY;  Surgeon: Pierce Silva MD;  Location: [facility] ENDOSCOPY;  Service: Endoscopy;  Laterality: N/A;  referred by Cris Egan NP for abdominal pain and gi hemorrhage unspecified suprep instructions sent to pt portal.cf   pre-call attempted; no answer left message; MS    COSMETIC SURGERY      EYE SURGERY      HYSTERECTOMY       LAPAROSCOPIC GASTRIC BANDING  2010    OOPHORECTOMY      TONSILLECTOMY  1978         Review of Systems:  ROS:  Constitutional: no fever or chills  Eyes: no visual changes  ENT: no nasal congestion or sore throat  Respiratory: no cough or shortness of breath  Cardiovascular: no chest pain or palpitations  Musculoskeletal: no arthralgias or myalgias        OBJECTIVE:     PHYSICAL EXAM:     , General Appearance: Well nourished, well developed, in no acute distress.  CV: 2+ UE/LE distal pulses bilaterally.  Resp:  Respirations equal and unlabored.  Neurological: Mood & affect are normal.  GI: Abdomen soft and non-tender.  left  Elbow:   Skin intact  No erythema or warmth  TTP lateral epicondyle  + long finger test  Pain worse with resisted wrist extension and supination  ltsi C5-T1  + epl, io, fds, fdp   2+ RP     RADIOGRAPHS:  X-rays of the Left elbow, personally reviewed by me, demonstrate no acute abnormality well maintained joint space.  No fracture or dislocation.     ASSESSMENT/PLAN:     53 year old female with left elbow lateral epicondylitis    Plan: Discussed with the patient at length all the different treatment options available for her elbow including anti-inflammatories, acetaminophen, rest, ice, physical therapy, range of motion exercise,  splinting,  and occasional cortisone injections for temporary relief    - Discussed rest, ice, activity modification and HEP   - will try short course of celebrex  - Bracing- has wrist brace she can borrow  - Follow up if no improvement

## 2024-10-17 NOTE — PROGRESS NOTES
Internal Medicine Annual Exam       CHIEF COMPLAINT     The patient, Toya Mendoza, who is a 53 y.o. female  with depression, anxiety, pre-DM, and chronic right knee pain presents for an annual exam.    HPI   History of Present Illness    CHIEF COMPLAINT:  Toya presents today for follow up.    SYNCOPAL EPISODE:  She experienced a syncopal episode in August, feeling dizzy while eating breakfast and subsequently losing consciousness. Her friend reported it as a seizure-like event. Evaluation at [facility], including EKG and other tests, were reportedly normal. The episode was diagnosed as vasovagal in nature. She denies any recurrence of symptoms since the initial event.    MUSCULOSKELETAL CONCERNS:  She reports pulling a muscle in her arm, which she describes as extremely painful and believes is affecting her blood pressure. She is scheduled to see an orthopedist following this appointment. She also mentions a broken toe from accidentally hitting a table while moving houses. She denies issues with her knees, coughing, wheezing, shortness of breath, chest pain, or leg swelling.    WEIGHT MANAGEMENT AND BLOOD SUGAR:  She reports stopping Ozempic (semaglutide) due to inconsistent supply, leading to a 25-pound weight gain. She expresses a desire to restart the medication. Her clothes have become tight due to the weight gain. She acknowledges continuing diet and exercise efforts for over six months without success in weight loss. She reports an increase in her A1C, which she correlates with recent weight gain and deviation from her dietary regimen.    SKIN ISSUE:  She reports a history of a brown recluse spider bite in the spring that initially had difficulty healing. She has since developed a granuloma at the site, describing it as constantly inflamed and irritated. It frequently becomes irritated, possibly due to friction from clothing, developing a wart-like appearance before being rubbed off.    RECENT  "LABS:  Recent labs show slightly elevated lymph percentage at 48.9% (upper limit of normal at 48%). Triglycerides are noted to be high. LDL and total cholesterol levels are reported as normal. Vitamin D and thyroid function tests are also normal. She is awaiting results of her B12 panel.    PREVENTIVE CARE:  She is due for her flu vaccine and agrees to receive it today. She is considering the COVID shot and seeks provider recommendation. Her mammogram is due in January for annual screening. Colorectal cancer screening is not due until . She is up to date on tetanus, shingles, and pneumonia vaccines.    ALTERNATIVE TREATMENTS:  She reports trying knee injections recommended by her son, involving a series of three injections followed by a week-long break and then three more injections. She is uncertain about the specific medication used but mentions it may have been hyaluronic acid or a substance containing "". She has also started cryotherapy, which she reports has significantly helped with her joint pain.        ROS:  General: -fever, -chills, -fatigue, +weight gain, -weight loss  Eyes: -vision changes, -redness, -discharge  ENT: -ear pain, -nasal congestion, -sore throat  Cardiovascular: -chest pain, -palpitations, -lower extremity edema  Respiratory: -cough, -shortness of breath, -wheezing  Gastrointestinal: -abdominal pain, -nausea, -vomiting, -diarrhea, -constipation, -blood in stool  Genitourinary: -dysuria, -hematuria, -frequency  Musculoskeletal: +joint pain, -muscle pain  Skin: -rash, -lesion  Neurological: -headache, +dizziness, -numbness, -tingling  Psychiatric: -anxiety, -depression, -sleep difficulty           Past Medical History:  Past Medical History:   Diagnosis Date    Anemia     Anxiety     Depression     Diabetes mellitus, type 2     Hypertriglyceridemia 10/17/2024    Migraine        Past Surgical History:   Procedure Laterality Date    ADENOIDECTOMY       SECTION      x 2 "    CHOLECYSTECTOMY      COLONOSCOPY N/A 5/17/2023    Procedure: COLONOSCOPY;  Surgeon: Pierce Silva MD;  Location: [facility] ENDOSCOPY;  Service: Endoscopy;  Laterality: N/A;  referred by Cris Egan NP for abdominal pain and gi hemorrhage unspecified suprep instructions sent to pt portal.cf  5/16 pre-call attempted; no answer left message; MS    COSMETIC SURGERY      EYE SURGERY  1992    HYSTERECTOMY      LAPAROSCOPIC GASTRIC BANDING  2010    OOPHORECTOMY      TONSILLECTOMY  1978        Family History   Problem Relation Name Age of Onset    Cancer Mother Francia Kam         kidney    Diabetes Mother Francia Kam     Arthritis Mother Francia Kam     Depression Mother Francia Kam     Thyroid disease Father Kodak Newman     Heart disease Father Kodak Newman         MI    Alcohol abuse Father Kodak Newman     Ovarian cancer Sister      Cancer Sister          cervical    No Known Problems Daughter      Diabetes Maternal Grandmother      Diabetes Paternal Grandmother      Cancer Brother  50        colon    Diabetes Brother      No Known Problems Son          Social History     Socioeconomic History    Marital status:     Number of children: 2   Tobacco Use    Smoking status: Light Smoker     Current packs/day: 0.25     Average packs/day: 0.3 packs/day for 2.0 years (0.5 ttl pk-yrs)     Types: Cigarettes    Smokeless tobacco: Never   Substance and Sexual Activity    Alcohol use: Yes     Alcohol/week: 8.0 standard drinks of alcohol     Types: 4 Glasses of wine, 4 Drinks containing 0.5 oz of alcohol per week     Comment: 8-14 drinks per week    Drug use: Yes     Frequency: 2.0 times per week     Types: Marijuana     Comment: for sleep - insomnia - edible    Sexual activity: Yes     Partners: Male     Birth control/protection: Condom     Social Drivers of Health     Financial Resource Strain: Low Risk  (2/7/2024)    Overall Financial Resource Strain (CARDIA)     Difficulty of Paying Living Expenses: Not hard  at all   Food Insecurity: No Food Insecurity (2/7/2024)    Hunger Vital Sign     Worried About Running Out of Food in the Last Year: Never true     Ran Out of Food in the Last Year: Never true   Transportation Needs: No Transportation Needs (2/7/2024)    PRAPARE - Transportation     Lack of Transportation (Medical): No     Lack of Transportation (Non-Medical): No   Physical Activity: Insufficiently Active (2/7/2024)    Exercise Vital Sign     Days of Exercise per Week: 3 days     Minutes of Exercise per Session: 30 min   Stress: Stress Concern Present (2/7/2024)    [employer] - Occupational Stress Questionnaire     Feeling of Stress : Rather much   Housing Stability: Unknown (2/7/2024)    Housing Stability Vital Sign     Unable to Pay for Housing in the Last Year: No     Unstable Housing in the Last Year: No        Social History     Tobacco Use   Smoking Status Light Smoker    Current packs/day: 0.25    Average packs/day: 0.3 packs/day for 2.0 years (0.5 ttl pk-yrs)    Types: Cigarettes   Smokeless Tobacco Never        Allergies as of 10/17/2024 - Reviewed 10/17/2024   Allergen Reaction Noted    Codeine Nausea Only 08/29/2018    Augmentin [amoxicillin-pot clavulanate] Nausea And Vomiting 02/23/2024          Home Medications:  Prior to Admission medications    Medication Sig Start Date End Date Taking? Authorizing Provider   OZEMPIC 1 mg/dose (4 mg/3 mL) Inject 1 mg into the skin every 7 days. 3/22/24   Provider, Historical       Review of Systems:  Review of Systems   Constitutional:  Negative for fatigue.   Cardiovascular:  Negative for chest pain.   Endocrine: Positive for polyuria. Negative for polydipsia and polyphagia.   Skin:  Negative for pallor.   Neurological:  Positive for weakness and headaches. Negative for dizziness, tremors, seizures and speech difficulty.   Psychiatric/Behavioral:  Negative for confusion. The patient is not nervous/anxious.        Health Maintainence:  "  Immunizations:  Health Maintenance         Date Due Completion Date    Influenza Vaccine (1) 09/01/2024 8/31/2023    COVID-19 Vaccine (6 - 2024-25 season) 09/01/2024 10/20/2022    Mammogram 01/31/2025 1/31/2024    Hemoglobin A1c (Prediabetes) 10/16/2025 10/16/2024    Colorectal Cancer Screening 05/17/2028 5/17/2023    Lipid Panel 10/16/2029 10/16/2024    TETANUS VACCINE 08/29/2033 8/29/2023    RSV Vaccine (Age 60+ and Pregnant patients) (1 - 1-dose 75+ series) 12/14/2043 ---             PHYSICAL EXAM     /82   Pulse 98   Ht 5' 7" (1.702 m)   Wt 104.4 kg (230 lb 2.6 oz)   SpO2 98%   BMI 36.05 kg/m²  Body mass index is 36.05 kg/m².    Physical Exam  Vitals reviewed.   Constitutional:       Appearance: She is well-developed.   HENT:      Head: Normocephalic.      Right Ear: External ear normal.      Left Ear: External ear normal.      Nose: Nose normal.      Mouth/Throat:      Pharynx: No oropharyngeal exudate.   Eyes:      Pupils: Pupils are equal, round, and reactive to light.   Neck:      Thyroid: No thyromegaly.      Vascular: No JVD.      Trachea: No tracheal deviation.   Cardiovascular:      Rate and Rhythm: Normal rate and regular rhythm.      Heart sounds: Normal heart sounds. No murmur heard.     No friction rub. No gallop.   Pulmonary:      Effort: Pulmonary effort is normal. No respiratory distress.      Breath sounds: Normal breath sounds. No wheezing or rales.   Abdominal:      General: Bowel sounds are normal. There is no distension.      Palpations: Abdomen is soft.      Tenderness: There is no abdominal tenderness.   Musculoskeletal:         General: No tenderness. Normal range of motion.      Cervical back: Neck supple.   Lymphadenopathy:      Cervical: No cervical adenopathy.   Skin:     General: Skin is warm and dry.      Findings: No rash.          Neurological:      Mental Status: She is alert and oriented to person, place, and time.   Psychiatric:         Behavior: Behavior normal. "         LABS     Lab Results   Component Value Date    HGBA1C 5.8 (H) 10/16/2024     CMP  Sodium   Date Value Ref Range Status   10/16/2024 143 136 - 145 mmol/L Final     Potassium   Date Value Ref Range Status   10/16/2024 4.4 3.5 - 5.1 mmol/L Final     Chloride   Date Value Ref Range Status   10/16/2024 108 95 - 110 mmol/L Final     CO2   Date Value Ref Range Status   10/16/2024 24 23 - 29 mmol/L Final     Glucose   Date Value Ref Range Status   10/16/2024 83 70 - 110 mg/dL Final     BUN   Date Value Ref Range Status   10/16/2024 11 6 - 20 mg/dL Final     Creatinine   Date Value Ref Range Status   10/16/2024 0.8 0.5 - 1.4 mg/dL Final     Calcium   Date Value Ref Range Status   10/16/2024 9.6 8.7 - 10.5 mg/dL Final     Total Protein   Date Value Ref Range Status   10/16/2024 7.2 6.0 - 8.4 g/dL Final     Albumin   Date Value Ref Range Status   10/16/2024 4.0 3.5 - 5.2 g/dL Final     Total Bilirubin   Date Value Ref Range Status   10/16/2024 0.3 0.1 - 1.0 mg/dL Final     Comment:     For infants and newborns, interpretation of results should be based  on gestational age, weight and in agreement with clinical  observations.    Premature Infant recommended reference ranges:  Up to 24 hours.............<8.0 mg/dL  Up to 48 hours............<12.0 mg/dL  3-5 days..................<15.0 mg/dL  6-29 days.................<15.0 mg/dL       Alkaline Phosphatase   Date Value Ref Range Status   10/16/2024 52 (L) 55 - 135 U/L Final     AST   Date Value Ref Range Status   10/16/2024 16 10 - 40 U/L Final     ALT   Date Value Ref Range Status   10/16/2024 17 10 - 44 U/L Final     Anion Gap   Date Value Ref Range Status   10/16/2024 11 8 - 16 mmol/L Final     Lab Results   Component Value Date    WBC 6.48 10/16/2024    HGB 14.4 10/16/2024    HCT 43.1 10/16/2024    MCV 89 10/16/2024     10/16/2024     Lab Results   Component Value Date    CHOL 174 10/16/2024    CHOL 196 03/27/2023    CHOL 177 01/30/2023     Lab Results    Component Value Date    HDL 46 10/16/2024    HDL 62 03/27/2023    HDL 61 01/30/2023     Lab Results   Component Value Date    LDLCALC 89.0 10/16/2024    LDLCALC 111.8 03/27/2023    LDLCALC 99.2 01/30/2023     Lab Results   Component Value Date    TRIG 195 (H) 10/16/2024    TRIG 111 03/27/2023    TRIG 84 01/30/2023     Lab Results   Component Value Date    CHOLHDL 26.4 10/16/2024    CHOLHDL 31.6 03/27/2023    CHOLHDL 34.5 01/30/2023     Lab Results   Component Value Date    TSH 1.260 10/16/2024       ASSESSMENT/PLAN     Toya Mendoza is a 53 y.o. female    Assessment & Plan    Assessed recent episode of loss of consciousness; likely vasovagal syncope based on low blood pressure and normal EKG results  Evaluated weight gain after discontinuation of semaglutide; decided to restart medication due to increased A1C and rapid weight gain  Considered management options for persistent granuloma at site of previous brown recluse bite; opted for initial trial of topical steroids before considering dermatology referral for steroid injection  Reviewed lab results: noted mildly elevated lymphocyte percentage, likely due to recent toe injury; elevated triglycerides, attributed to increased blood sugar; normal cholesterol levels  Recommend COVID-19 vaccination due to increasing community prevalence and approaching cold/flu season    VASOVAGAL EPISODES:  - Explained that vasovagal episodes can mimic seizures due to reduced blood flow to the brain.    WEIGHT MANAGEMENT:  - Discussed the importance of consistent semaglutide use to avoid side effects from restarting at higher doses.  - Toya to continue healthy diet and exercise regimen in conjunction with semaglutide treatment.  - Started semaglutide 0.5 mg weekly for 4 weeks, then increase to 1 mg weekly after notifying the office.  - Contact office to increase semaglutide dose after 4 weeks.    GRANULOMA:  - Provided information on granulation tissue formation as a  normal part of the healing process.  - Started topical steroid ointment for granuloma, apply twice daily for 3-4 weeks: During the day, apply cream and cover with a Band-Aid.  - At night, apply cream and leave uncovered.  - Contact office if no improvement in granuloma after 3-4 weeks of topical steroid treatment, may need referral to dermatology for steroid injection.    FLU VACCINATION:  - Flu shot administered in office.    COVID-19 VACCINATION:  - COVID-19 vaccine administered in office.    FOLLOW UP:  - Follow up in January for mammogram.  - Follow up in March for annual visit.       Prediabetes  -     semaglutide, weight loss, 0.5 mg/0.5 mL PnIj; Inject 0.5 mg into the skin every 7 days.  Dispense: 0.5 mL; Refill: 1    S/P laparoscopic sleeve gastrectomy    Major depression, chronic/Anxiety-s table. Will cont diet and exercise and mindfulness exercises     Primary osteoarthritis of right knee- improved with injections and cryotherapy     Granuloma, skin  -     triamcinolone acetonide 0.1% (KENALOG) 0.1 % ointment; Apply topically 2 (two) times daily.  Dispense: 30 g; Refill: 1    Hypertriglyceridemia- will start semagyltide again with diet and exercise. Will monitor     Follow up with PCP    Cris Egan-Nithin HIGHTOWER, REFUGIO, FNP-c   Department of Internal Medicine - [facility] [address]  9:39 AM    Answers submitted by the patient for this visit:  Diabetes Questionnaire (Submitted on 10/14/2024)  Chief Complaint: Diabetes problem  Diabetes type: type 2  MedicAlert ID: No  Disease duration: 23 Years  blurred vision: No  foot paresthesias: No  foot ulcerations: No  visual change: Yes  weight loss: No  Symptom course: worsening  hunger: No  mood changes: No  sleepiness: Yes  sweats: No  blackouts: Yes  hospitalization: Yes  nocturnal hypoglycemia: No  required assistance: Yes  required glucagon: No  CVA: No  heart disease: No  nephropathy: No  peripheral neuropathy: No  PVD: No  retinopathy: No  autonomic  neuropathy: No  CAD risks: family history, obesity, post-menopausal, stress  Current treatments: diet  Treatment compliance: all of the time  Weight trend: increasing steadily  Current diet: generally healthy  Meal planning: carbohydrate counting  Exercise: daily  Dietitian visit: No  Eye exam current: Yes  Sees podiatrist: No

## 2024-10-18 LAB — GASTRIN P FAST SERPL-MCNC: 54 PG/ML

## 2024-10-22 ENCOUNTER — PATIENT MESSAGE (OUTPATIENT)
Dept: INTERNAL MEDICINE | Facility: CLINIC | Age: 54
End: 2024-10-22
Payer: COMMERCIAL

## 2024-10-22 DIAGNOSIS — E53.8 B12 DEFICIENCY: Primary | ICD-10-CM

## 2024-10-22 RX ORDER — CYANOCOBALAMIN 1000 UG/ML
1000 INJECTION, SOLUTION INTRAMUSCULAR; SUBCUTANEOUS
Qty: 10 ML | Refills: 3 | Status: SHIPPED | OUTPATIENT
Start: 2024-10-22

## 2024-11-12 ENCOUNTER — OFFICE VISIT (OUTPATIENT)
Dept: DERMATOLOGY | Facility: CLINIC | Age: 54
End: 2024-11-12
Payer: COMMERCIAL

## 2024-11-12 DIAGNOSIS — B07.9 VERRUCA VULGARIS: ICD-10-CM

## 2024-11-12 DIAGNOSIS — D48.5 NEOPLASM OF UNCERTAIN BEHAVIOR OF SKIN: Primary | ICD-10-CM

## 2024-11-12 PROCEDURE — 99999 PR PBB SHADOW E&M-EST. PATIENT-LVL III: CPT | Mod: PBBFAC,,, | Performed by: STUDENT IN AN ORGANIZED HEALTH CARE EDUCATION/TRAINING PROGRAM

## 2024-11-12 PROCEDURE — 11102 TANGNTL BX SKIN SINGLE LES: CPT | Mod: XS,S$GLB,, | Performed by: STUDENT IN AN ORGANIZED HEALTH CARE EDUCATION/TRAINING PROGRAM

## 2024-11-12 PROCEDURE — 88305 TISSUE EXAM BY PATHOLOGIST: CPT | Performed by: DERMATOLOGY

## 2024-11-12 PROCEDURE — 99499 UNLISTED E&M SERVICE: CPT | Mod: S$GLB,,, | Performed by: STUDENT IN AN ORGANIZED HEALTH CARE EDUCATION/TRAINING PROGRAM

## 2024-11-12 PROCEDURE — 17110 DESTRUCTION B9 LES UP TO 14: CPT | Mod: S$GLB,,, | Performed by: STUDENT IN AN ORGANIZED HEALTH CARE EDUCATION/TRAINING PROGRAM

## 2024-11-12 NOTE — PATIENT INSTRUCTIONS
Shave Biopsy Wound Care    Your doctor has performed a shave biopsy today.  A band aid and vaseline ointment has been placed over the site.  This should remain in place for NO LONGER THAN 48 hours.  It is fine to remove the bandaid after 24 hours, if the area is no longer bleeding. It is recommended that you keep the area dry (do not wet)) for the first 24 hours.  After 24 hours, wash the area with warm soap and water and apply Vaseline jelly.  Many patients prefer to use Neosporin or Bacitracin ointment.  This is acceptable; however, know that you can develop an allergy to this medication even if you have used it safely for years.  It is important to keep the area moist.  Letting it dry out and get air slows healing time, and will worsen the scar.        If you notice increasing redness, tenderness, pain, or yellow drainage at the biopsy site, please notify your doctor.  These are signs of an infection.    If your biopsy site is bleeding, apply firm pressure for 15 minutes straight.  Repeat for another 15 minutes, if it is still bleeding.   If the surgical site continues to bleed, then please contact your doctor.      For [facility] users:   You will receive your biopsy results in [facility] as soon as they are available. Please be assured that your physician/provider will review your results and will then determine what further treatment, evaluation, or planning is required. You should be contacted by your physician's/provider's office within 5 business days of receiving your results; If not, please reach out to directly. This is one more way [employer] is putting you first.     [facility] [place] [address]/ [phone] FAX/ [email or web address]         CRYOSURGERY      Your doctor has used a method called cryosurgery to treat your skin condition. Cryosurgery refers to the use of very cold substances to treat a variety of skin conditions such as warts, pre-skin cancers, molluscum  contagiosum, sun spots, and several benign growths. The substance we use in cryosurgery is liquid nitrogen and is so cold (-195 degrees Celsius) that is burns when administered.     Following treatment in the office, the skin may immediately burn and become red. You may find the area around the lesion is affected as well. It is sometimes necessary to treat not only the lesion, but a small area of the surrounding normal skin to achieve a good response.     A blister, and even a blood filled blister, may form after treatment.   This is a normal response. If the blister is painful, it is acceptable to sterilize a needle and with rubbing alcohol and gently pop the blister. It is important that you gently wash the area with soap and warm water as the blister fluid may contain wart virus if a wart was treated. Do no remove the roof of the blister.     The area treated can take anywhere from 1-3 weeks to heal. Healing time depends on the kind skin lesion treated, the location, and how aggressively the lesion was treated. It is recommended that the areas treated are covered with Vaseline or bacitracin ointment and a band-aid. If a band-aid is not practical, just ointment applied several times per day will do. Keeping these areas moist will speed the healing time.    Treatment with liquid nitrogen can leave a scar. In dark skin, it may be a light or dark scar, in light skin it may be a white or pink scar. These will generally fade with time, but may never go away completely.     If you have any concerns after your treatment, please feel free to call the office.       [facility] [place] [address]/ [phone] FAX/ [email or web address]

## 2024-11-12 NOTE — PROGRESS NOTES
Subjective:      Patient ID:  Toya Mendoza is a 53 y.o. female who presents for   Chief Complaint   Patient presents with    Lesion     Pt here for granuloma/ keloid from spider bite on R lower leg    Pt reports brown recluse spider bite in March 2024. Lesion has not healed and is tender/ itchy. Pt has applied Kenalog ointment for several weeks but not resolved.      Lesion        Review of Systems    Objective:   Physical Exam   Constitutional: She appears well-developed and well-nourished.   Neurological: She is alert and oriented to person, place, and time.   Psychiatric: She has a normal mood and affect.   Skin:   Areas Examined (abnormalities noted in diagram):   Back Inspection Performed  RLE Inspected            Diagram Legend     Erythematous scaling macule/papule c/w actinic keratosis       Vascular papule c/w angioma      Pigmented verrucoid papule/plaque c/w seborrheic keratosis      Yellow umbilicated papule c/w sebaceous hyperplasia      Irregularly shaped tan macule c/w lentigo     1-2 mm smooth white papules consistent with Milia      Movable subcutaneous cyst with punctum c/w epidermal inclusion cyst      Subcutaneous movable cyst c/w pilar cyst      Firm pink to brown papule c/w dermatofibroma      Pedunculated fleshy papule(s) c/w skin tag(s)      Evenly pigmented macule c/w junctional nevus     Mildly variegated pigmented, slightly irregular-bordered macule c/w mildly atypical nevus      Flesh colored to evenly pigmented papule c/w intradermal nevus       Pink pearly papule/plaque c/w basal cell carcinoma      Erythematous hyperkeratotic cursted plaque c/w SCC      Surgical scar with no sign of skin cancer recurrence      Open and closed comedones      Inflammatory papules and pustules      Verrucoid papule consistent consistent with wart     Erythematous eczematous patches and plaques     Dystrophic onycholytic nail with subungual debris c/w onychomycosis     Umbilicated papule     Erythematous-base heme-crusted tan verrucoid plaque consistent with inflamed seborrheic keratosis     Erythematous Silvery Scaling Plaque c/w Psoriasis     See annotation            Assessment / Plan:      Pathology Orders:       Normal Orders This Visit    Specimen to Pathology, Dermatology     Questions:    Procedure Type: Dermatology and skin neoplasms    Number of Specimens: 1    ------------------------: -------------------------    Spec 1 Procedure: Biopsy    Spec 1 Clinical Impression: wart. R/o olena WALKER    Spec 1 Source: right anterior thigh    Release to patient: Immediate    Release to patient:           Neoplasm of uncertain behavior of skin  -     Specimen to Pathology, Dermatology    Shave biopsy procedure note:    Shave biopsy performed after verbal consent including risk of infection, scar, recurrence, need for additional treatment of site. Area prepped with alcohol, anesthetized with approximately 1.0cc of 1% lidocaine with epinephrine. Lesional tissue shaved with razor blade. Hemostasis achieved with application of aluminum chloride followed by hyfrecation. No complications. Dressing applied. Wound care explained.      Verruca vulgaris  Cryosurgery procedure note:    Verbal consent from the patient is obtained including, but not limited to, risk of hypopigmentation/hyperpigmentation, scar, recurrence of lesion. Liquid nitrogen cryosurgery is applied to 6 lesions to produce a freeze injury. The patient is aware that blisters may form and is instructed on wound care with gentle cleansing and use of vaseline ointment to keep moist until healed. The patient is supplied a handout on cryosurgery and is instructed to call if lesions do not completely resolve.             Follow up if symptoms worsen or fail to improve.

## 2024-11-13 LAB
FINAL PATHOLOGIC DIAGNOSIS: NORMAL
GROSS: NORMAL
Lab: NORMAL
MICROSCOPIC EXAM: NORMAL

## 2024-11-23 DIAGNOSIS — E66.811 CLASS 1 OBESITY WITH BODY MASS INDEX (BMI) OF 34.0 TO 34.9 IN ADULT, UNSPECIFIED OBESITY TYPE, UNSPECIFIED WHETHER SERIOUS COMORBIDITY PRESENT: Primary | ICD-10-CM

## 2024-11-23 DIAGNOSIS — R73.03 PREDIABETES: ICD-10-CM

## 2024-11-26 ENCOUNTER — TELEPHONE (OUTPATIENT)
Dept: INTERNAL MEDICINE | Facility: CLINIC | Age: 54
End: 2024-11-26
Payer: COMMERCIAL

## 2024-11-26 NOTE — TELEPHONE ENCOUNTER
----- Message from Mirza sent at 11/26/2024  2:17 PM CST -----  Contact: [phone]  Pharmacy is calling to clarify an RX.    RX name:    semaglutide (OZEMPIC) 1 mg/dose (2 mg/1.5 mL) PnIj       What do they need to clarify:  strength has been discontinued, please send over new amount    Comments:

## 2024-11-26 NOTE — TELEPHONE ENCOUNTER
The pharmacist wants you to resend pt semaglutide (OZEMPIC) 1 mg/dose (2 mg/1.5 mL) PnIj, with the strength has been discontinued, please send over new amount. Please advise. Thanks.   [facility] [address] [place] [address], [place] - [address] [facility]

## 2024-12-10 ENCOUNTER — PATIENT MESSAGE (OUTPATIENT)
Dept: INTERNAL MEDICINE | Facility: CLINIC | Age: 54
End: 2024-12-10
Payer: COMMERCIAL

## 2024-12-10 DIAGNOSIS — R73.03 PREDIABETES: Primary | ICD-10-CM

## 2024-12-10 DIAGNOSIS — E66.811 CLASS 1 OBESITY WITH BODY MASS INDEX (BMI) OF 34.0 TO 34.9 IN ADULT, UNSPECIFIED OBESITY TYPE, UNSPECIFIED WHETHER SERIOUS COMORBIDITY PRESENT: ICD-10-CM

## 2024-12-10 DIAGNOSIS — E78.1 HYPERTRIGLYCERIDEMIA: ICD-10-CM

## 2024-12-17 ENCOUNTER — PATIENT MESSAGE (OUTPATIENT)
Dept: ORTHOPEDICS | Facility: CLINIC | Age: 54
End: 2024-12-17
Payer: COMMERCIAL

## 2024-12-17 RX ORDER — MELOXICAM 15 MG/1
15 TABLET ORAL DAILY
Qty: 30 TABLET | Refills: 1 | Status: SHIPPED | OUTPATIENT
Start: 2024-12-17

## 2024-12-28 DIAGNOSIS — R73.03 PREDIABETES: ICD-10-CM

## 2024-12-28 DIAGNOSIS — E78.1 HYPERTRIGLYCERIDEMIA: ICD-10-CM

## 2024-12-28 DIAGNOSIS — E66.811 CLASS 1 OBESITY WITH BODY MASS INDEX (BMI) OF 34.0 TO 34.9 IN ADULT, UNSPECIFIED OBESITY TYPE, UNSPECIFIED WHETHER SERIOUS COMORBIDITY PRESENT: ICD-10-CM

## 2024-12-30 RX ORDER — SEMAGLUTIDE 1.7 MG/.75ML
INJECTION, SOLUTION SUBCUTANEOUS
Qty: 0.75 ML | Refills: 1 | Status: SHIPPED | OUTPATIENT
Start: 2024-12-30

## 2025-01-05 DIAGNOSIS — L92.9 GRANULOMA, SKIN: ICD-10-CM

## 2025-01-06 RX ORDER — TRIAMCINOLONE ACETONIDE 1 MG/G
1 OINTMENT TOPICAL 2 TIMES DAILY
Qty: 30 G | Refills: 1 | Status: SHIPPED | OUTPATIENT
Start: 2025-01-06

## 2025-02-04 ENCOUNTER — PATIENT MESSAGE (OUTPATIENT)
Dept: INTERNAL MEDICINE | Facility: CLINIC | Age: 55
End: 2025-02-04
Payer: COMMERCIAL

## 2025-02-04 DIAGNOSIS — E66.811 CLASS 1 OBESITY WITH BODY MASS INDEX (BMI) OF 34.0 TO 34.9 IN ADULT, UNSPECIFIED OBESITY TYPE, UNSPECIFIED WHETHER SERIOUS COMORBIDITY PRESENT: ICD-10-CM

## 2025-02-04 DIAGNOSIS — E78.1 HYPERTRIGLYCERIDEMIA: ICD-10-CM

## 2025-02-04 DIAGNOSIS — R73.03 PREDIABETES: Primary | ICD-10-CM

## 2025-02-04 DIAGNOSIS — E53.8 B12 DEFICIENCY: ICD-10-CM

## 2025-02-05 ENCOUNTER — LAB VISIT (OUTPATIENT)
Dept: LAB | Facility: HOSPITAL | Age: 55
End: 2025-02-05
Payer: COMMERCIAL

## 2025-02-05 DIAGNOSIS — E53.8 B12 DEFICIENCY: ICD-10-CM

## 2025-02-05 DIAGNOSIS — E78.1 HYPERTRIGLYCERIDEMIA: ICD-10-CM

## 2025-02-05 DIAGNOSIS — E66.811 CLASS 1 OBESITY WITH BODY MASS INDEX (BMI) OF 34.0 TO 34.9 IN ADULT, UNSPECIFIED OBESITY TYPE, UNSPECIFIED WHETHER SERIOUS COMORBIDITY PRESENT: ICD-10-CM

## 2025-02-05 DIAGNOSIS — R73.03 PREDIABETES: ICD-10-CM

## 2025-02-05 LAB
ALBUMIN SERPL BCP-MCNC: 4.2 G/DL (ref 3.5–5.2)
ALP SERPL-CCNC: 48 U/L (ref 40–150)
ALT SERPL W/O P-5'-P-CCNC: 12 U/L (ref 10–44)
ANION GAP SERPL CALC-SCNC: 8 MMOL/L (ref 8–16)
AST SERPL-CCNC: 14 U/L (ref 10–40)
BASOPHILS # BLD AUTO: 0.07 K/UL (ref 0–0.2)
BASOPHILS NFR BLD: 1 % (ref 0–1.9)
BILIRUB SERPL-MCNC: 0.4 MG/DL (ref 0.1–1)
BUN SERPL-MCNC: 12 MG/DL (ref 6–20)
CALCIUM SERPL-MCNC: 9.6 MG/DL (ref 8.7–10.5)
CHLORIDE SERPL-SCNC: 105 MMOL/L (ref 95–110)
CHOLEST SERPL-MCNC: 173 MG/DL (ref 120–199)
CHOLEST/HDLC SERPL: 3.5 {RATIO} (ref 2–5)
CO2 SERPL-SCNC: 26 MMOL/L (ref 23–29)
CREAT SERPL-MCNC: 0.8 MG/DL (ref 0.5–1.4)
DIFFERENTIAL METHOD BLD: NORMAL
EOSINOPHIL # BLD AUTO: 0.5 K/UL (ref 0–0.5)
EOSINOPHIL NFR BLD: 7.3 % (ref 0–8)
ERYTHROCYTE [DISTWIDTH] IN BLOOD BY AUTOMATED COUNT: 13.1 % (ref 11.5–14.5)
EST. GFR  (NO RACE VARIABLE): >60 ML/MIN/1.73 M^2
ESTIMATED AVG GLUCOSE: 114 MG/DL (ref 68–131)
GLUCOSE SERPL-MCNC: 116 MG/DL (ref 70–110)
HBA1C MFR BLD: 5.6 % (ref 4–5.6)
HCT VFR BLD AUTO: 42.6 % (ref 37–48.5)
HDLC SERPL-MCNC: 49 MG/DL (ref 40–75)
HDLC SERPL: 28.3 % (ref 20–50)
HGB BLD-MCNC: 13.9 G/DL (ref 12–16)
IMM GRANULOCYTES # BLD AUTO: 0.02 K/UL (ref 0–0.04)
IMM GRANULOCYTES NFR BLD AUTO: 0.3 % (ref 0–0.5)
LDLC SERPL CALC-MCNC: 100.4 MG/DL (ref 63–159)
LYMPHOCYTES # BLD AUTO: 3.3 K/UL (ref 1–4.8)
LYMPHOCYTES NFR BLD: 45.7 % (ref 18–48)
MCH RBC QN AUTO: 29.6 PG (ref 27–31)
MCHC RBC AUTO-ENTMCNC: 32.6 G/DL (ref 32–36)
MCV RBC AUTO: 91 FL (ref 82–98)
MONOCYTES # BLD AUTO: 0.5 K/UL (ref 0.3–1)
MONOCYTES NFR BLD: 6.7 % (ref 4–15)
NEUTROPHILS # BLD AUTO: 2.8 K/UL (ref 1.8–7.7)
NEUTROPHILS NFR BLD: 39 % (ref 38–73)
NONHDLC SERPL-MCNC: 124 MG/DL
NRBC BLD-RTO: 0 /100 WBC
PLATELET # BLD AUTO: 316 K/UL (ref 150–450)
PMV BLD AUTO: 10.1 FL (ref 9.2–12.9)
POTASSIUM SERPL-SCNC: 4.6 MMOL/L (ref 3.5–5.1)
PROT SERPL-MCNC: 7.4 G/DL (ref 6–8.4)
RBC # BLD AUTO: 4.69 M/UL (ref 4–5.4)
SODIUM SERPL-SCNC: 139 MMOL/L (ref 136–145)
TRIGL SERPL-MCNC: 118 MG/DL (ref 30–150)
WBC # BLD AUTO: 7.17 K/UL (ref 3.9–12.7)

## 2025-02-05 PROCEDURE — 80061 LIPID PANEL: CPT | Performed by: NURSE PRACTITIONER

## 2025-02-05 PROCEDURE — 85025 COMPLETE CBC W/AUTO DIFF WBC: CPT | Performed by: NURSE PRACTITIONER

## 2025-02-05 PROCEDURE — 82941 ASSAY OF GASTRIN: CPT | Performed by: NURSE PRACTITIONER

## 2025-02-05 PROCEDURE — 82607 VITAMIN B-12: CPT | Performed by: NURSE PRACTITIONER

## 2025-02-05 PROCEDURE — 80053 COMPREHEN METABOLIC PANEL: CPT | Performed by: NURSE PRACTITIONER

## 2025-02-05 PROCEDURE — 83036 HEMOGLOBIN GLYCOSYLATED A1C: CPT | Performed by: NURSE PRACTITIONER

## 2025-02-05 PROCEDURE — 86340 INTRINSIC FACTOR ANTIBODY: CPT | Performed by: NURSE PRACTITIONER

## 2025-02-06 ENCOUNTER — HOSPITAL ENCOUNTER (OUTPATIENT)
Dept: RADIOLOGY | Facility: HOSPITAL | Age: 55
Discharge: HOME | End: 2025-02-06
Attending: NURSE PRACTITIONER
Payer: COMMERCIAL

## 2025-02-06 ENCOUNTER — OFFICE VISIT (OUTPATIENT)
Dept: INTERNAL MEDICINE | Facility: CLINIC | Age: 55
End: 2025-02-06
Payer: COMMERCIAL

## 2025-02-06 VITALS
WEIGHT: 233 LBS | HEIGHT: 67 IN | SYSTOLIC BLOOD PRESSURE: 120 MMHG | BODY MASS INDEX: 36.57 KG/M2 | DIASTOLIC BLOOD PRESSURE: 94 MMHG | HEART RATE: 78 BPM | OXYGEN SATURATION: 98 %

## 2025-02-06 DIAGNOSIS — E66.811 CLASS 1 OBESITY WITHOUT SERIOUS COMORBIDITY WITH BODY MASS INDEX (BMI) OF 34.0 TO 34.9 IN ADULT, UNSPECIFIED OBESITY TYPE: ICD-10-CM

## 2025-02-06 DIAGNOSIS — G89.29 CHRONIC HEEL PAIN, LEFT: ICD-10-CM

## 2025-02-06 DIAGNOSIS — Z80.0 FAMILY HISTORY OF COLON CANCER: ICD-10-CM

## 2025-02-06 DIAGNOSIS — E78.1 HYPERTRIGLYCERIDEMIA: ICD-10-CM

## 2025-02-06 DIAGNOSIS — R73.03 PREDIABETES: ICD-10-CM

## 2025-02-06 DIAGNOSIS — M17.11 PRIMARY OSTEOARTHRITIS OF RIGHT KNEE: ICD-10-CM

## 2025-02-06 DIAGNOSIS — M79.672 CHRONIC HEEL PAIN, LEFT: ICD-10-CM

## 2025-02-06 DIAGNOSIS — M25.512 LEFT SHOULDER PAIN, UNSPECIFIED CHRONICITY: ICD-10-CM

## 2025-02-06 DIAGNOSIS — E66.812 CLASS 2 SEVERE OBESITY WITH SERIOUS COMORBIDITY AND BODY MASS INDEX (BMI) OF 36.0 TO 36.9 IN ADULT, UNSPECIFIED OBESITY TYPE: ICD-10-CM

## 2025-02-06 DIAGNOSIS — E66.811 CLASS 1 OBESITY WITH BODY MASS INDEX (BMI) OF 34.0 TO 34.9 IN ADULT, UNSPECIFIED OBESITY TYPE, UNSPECIFIED WHETHER SERIOUS COMORBIDITY PRESENT: ICD-10-CM

## 2025-02-06 DIAGNOSIS — Z00.00 ANNUAL PHYSICAL EXAM: Primary | ICD-10-CM

## 2025-02-06 DIAGNOSIS — F32.9 MAJOR DEPRESSION, CHRONIC: ICD-10-CM

## 2025-02-06 DIAGNOSIS — F41.9 ANXIETY: ICD-10-CM

## 2025-02-06 DIAGNOSIS — E66.01 CLASS 2 SEVERE OBESITY WITH SERIOUS COMORBIDITY AND BODY MASS INDEX (BMI) OF 36.0 TO 36.9 IN ADULT, UNSPECIFIED OBESITY TYPE: ICD-10-CM

## 2025-02-06 DIAGNOSIS — Z98.84 S/P LAPAROSCOPIC SLEEVE GASTRECTOMY: ICD-10-CM

## 2025-02-06 LAB
ANNOTATION COMMENT IMP: NORMAL
GASTRIN P FAST SERPL-MCNC: 42 PG/ML
IF BLOCK AB SER QL: NEGATIVE
VIT B12 SERPL-MCNC: 109 NG/L (ref 180–914)

## 2025-02-06 PROCEDURE — 99396 PREV VISIT EST AGE 40-64: CPT | Mod: S$GLB,,, | Performed by: NURSE PRACTITIONER

## 2025-02-06 PROCEDURE — 3074F SYST BP LT 130 MM HG: CPT | Mod: CPTII,S$GLB,, | Performed by: NURSE PRACTITIONER

## 2025-02-06 PROCEDURE — 3044F HG A1C LEVEL LT 7.0%: CPT | Mod: CPTII,S$GLB,, | Performed by: NURSE PRACTITIONER

## 2025-02-06 PROCEDURE — 3008F BODY MASS INDEX DOCD: CPT | Mod: CPTII,S$GLB,, | Performed by: NURSE PRACTITIONER

## 2025-02-06 PROCEDURE — 3080F DIAST BP >= 90 MM HG: CPT | Mod: CPTII,S$GLB,, | Performed by: NURSE PRACTITIONER

## 2025-02-06 PROCEDURE — 73630 X-RAY EXAM OF FOOT: CPT | Mod: 26,LT,, | Performed by: INTERNAL MEDICINE

## 2025-02-06 PROCEDURE — 99999 PR PBB SHADOW E&M-EST. PATIENT-LVL IV: CPT | Mod: PBBFAC,,, | Performed by: NURSE PRACTITIONER

## 2025-02-06 PROCEDURE — 73630 X-RAY EXAM OF FOOT: CPT | Mod: TC,LT

## 2025-02-06 PROCEDURE — 1159F MED LIST DOCD IN RCRD: CPT | Mod: CPTII,S$GLB,, | Performed by: NURSE PRACTITIONER

## 2025-02-06 RX ORDER — ALPRAZOLAM 0.25 MG/1
0.25 TABLET ORAL NIGHTLY PRN
Qty: 30 TABLET | Refills: 0 | Status: SHIPPED | OUTPATIENT
Start: 2025-02-06 | End: 2025-03-08

## 2025-02-06 RX ORDER — MELOXICAM 15 MG/1
15 TABLET ORAL
Qty: 30 TABLET | Refills: 1 | Status: SHIPPED | OUTPATIENT
Start: 2025-02-06

## 2025-02-06 NOTE — PROGRESS NOTES
Internal Medicine Annual Exam       CHIEF COMPLAINT     The patient, Toya Mendoza, who is a 54 y.o. female with depression, anxiety, pre-DM, and chronic right knee pain presents for an annual exam.    HPI   Here for follow up     History of Present Illness    CHIEF COMPLAINT:  Patient presents today for follow up of multiple musculoskeletal complaints    MUSCULOSKELETAL PAIN:  She was initially diagnosed with tennis elbow, but pain has migrated from elbow to upper arm/bicep region. Pain is severe enough to cause nighttime awakening with certain movements, described as intense pain. She takes meloxicam and acetaminophen 1000mg at bedtime for pain management, which has reduced frequency of nighttime awakening. She also reports left heel pain that started after walking in [place] during [place], suspecting plantar fasciitis. The pain is localized to the bottom of the heel and feels like a bone bruise. She denies pain in the arch or back of the heel.    MENTAL HEALTH:  She experiences panic attacks that have caused her to leave social situations and stay home for multiple days at a time. She takes non-psychedelic mushroom supplements ([place] [employer], [place] [place], and Reishi) for mood and inflammation, noting improvement in mood with these supplements. She experienced significant agitation when discontinuing them during a week-long cruise.    WEIGHT MANAGEMENT:  She takes Wegovy 1.7mg with inconsistent supply due to pharmacy availability issues. She has experienced a 20-pound weight regain that has persisted despite medication.    LABS:  Her average blood sugar has decreased, though fasting glucose remains slightly elevated.      ROS:  General: -fever, -chills, -fatigue, +weight gain, -weight loss  Eyes: -vision changes, -redness, -discharge  ENT: -ear pain, -nasal congestion, -sore throat  Cardiovascular: -chest pain, -palpitations, -lower extremity edema  Respiratory: -cough, -shortness of  breath  Gastrointestinal: -abdominal pain, -nausea, -vomiting, -diarrhea, -constipation, -blood in stool  Genitourinary: -dysuria, -hematuria, -frequency  Musculoskeletal: +joint pain, +muscle pain  Skin: -rash, -lesion  Neurological: -headache, -dizziness, -numbness, -tingling  Psychiatric: +anxiety, -depression, -sleep difficulty         Anxiety and depression- using mushroom supplements for anxiety   Had some panic attacks and agoraphobia    Elbow pain/tennis elbow - using meloxicam and tylenol as needed     Meniscal tear - resolved has complted recovery     Heel pain to the left - after walking tour in [place]     -  MMG- scheduled   CRCS- UTD       Past Medical History:  Past Medical History:   Diagnosis Date    Anemia     Anxiety     Depression     Diabetes mellitus, type 2     Hypertriglyceridemia 10/17/2024    Migraine        Past Surgical History:   Procedure Laterality Date    ADENOIDECTOMY       SECTION      x 2    CHOLECYSTECTOMY      COLONOSCOPY N/A 2023    Procedure: COLONOSCOPY;  Surgeon: Pierce Silva MD;  Location: [facility] END[facility];  Service: Endoscopy;  Laterality: N/A;  referred by Cris Egan NP for abdominal pain and gi hemorrhage unspecified suprep instructions sent to pt portal.cf   pre-call attempted; no answer left message; MS    COSMETIC SURGERY      EYE SURGERY      HYSTERECTOMY      LAPAROSCOPIC GASTRIC BANDING  2010    OOPHORECTOMY      TONSILLECTOMY          Family History   Problem Relation Name Age of Onset    Cancer Mother Francia Kam         kidney    Diabetes Mother Francia Kam     Arthritis Mother Francia Kam     Depression Mother Francia Kam     Thyroid disease Father Kodak Newman     Heart disease Father Kodak Newman         MI    Alcohol abuse Father Kodak Newman     Ovarian cancer Sister      Cancer Sister          cervical    No Known Problems Daughter      Diabetes Maternal Grandmother      Diabetes Paternal Grandmother      Cancer  Brother  50        colon    Diabetes Brother      No Known Problems Son          Social History     Socioeconomic History    Marital status:     Number of children: 2   Tobacco Use    Smoking status: Light Smoker     Current packs/day: 0.25     Average packs/day: 0.3 packs/day for 2.0 years (0.5 ttl pk-yrs)     Types: Cigarettes    Smokeless tobacco: Never   Substance and Sexual Activity    Alcohol use: Yes     Alcohol/week: 8.0 standard drinks of alcohol     Types: 4 Glasses of wine, 4 Drinks containing 0.5 oz of alcohol per week     Comment: 8-14 drinks per week    Drug use: Yes     Frequency: 2.0 times per week     Types: Marijuana     Comment: for sleep - insomnia - edible    Sexual activity: Yes     Partners: Male     Birth control/protection: Condom     Social Drivers of Health     Financial Resource Strain: Low Risk  (2/7/2024)    Overall Financial Resource Strain (CARDIA)     Difficulty of Paying Living Expenses: Not hard at all   Food Insecurity: No Food Insecurity (2/7/2024)    Hunger Vital Sign     Worried About Running Out of Food in the Last Year: Never true     Ran Out of Food in the Last Year: Never true   Transportation Needs: No Transportation Needs (2/7/2024)    PRAPARE - Transportation     Lack of Transportation (Medical): No     Lack of Transportation (Non-Medical): No   Physical Activity: Insufficiently Active (2/7/2024)    Exercise Vital Sign     Days of Exercise per Week: 3 days     Minutes of Exercise per Session: 30 min   Stress: Stress Concern Present (2/7/2024)    [employer] - Occupational Stress Questionnaire     Feeling of Stress : Rather much   Housing Stability: Unknown (2/7/2024)    Housing Stability Vital Sign     Unable to Pay for Housing in the Last Year: No     Unstable Housing in the Last Year: No        Social History     Tobacco Use   Smoking Status Light Smoker    Current packs/day: 0.25    Average packs/day: 0.3 packs/day for 2.0 years  "(0.5 ttl pk-yrs)    Types: Cigarettes   Smokeless Tobacco Never        Allergies as of 02/06/2025 - Reviewed 02/06/2025   Allergen Reaction Noted    Codeine Nausea Only 08/29/2018    Augmentin [amoxicillin-pot clavulanate] Nausea And Vomiting 02/23/2024          Home Medications:  Prior to Admission medications    Medication Sig Start Date End Date Taking? Authorizing Provider   cyanocobalamin 1,000 mcg/mL injection Inject 1 mL (1,000 mcg total) into the skin every 28 days. 10/22/24  Yes CarlusCris Jacob NP   meloxicam (MOBIC) 15 MG tablet TAKE ONE TABLET BY MOUTH DAILY 2/6/25  Yes Debby Lin PA-C   syringe with needle 1 mL 22 gauge x 1 1/2" Syrg 1 each by Misc.(Non-Drug; Combo Route) route every 28 days. 10/22/24  Yes Cris Peralta NP   triamcinolone acetonide 0.1% (KENALOG) 0.1 % ointment APPLY TOPICALLY TWICE DAILY 1/6/25  Yes Cris Peralta NP   WEGOVY 1.7 mg/0.75 mL PnIj INJECT 1.7MG UNDER THE SKIN EVERY SEVEN DAYS 12/30/24  Yes Cris Peralta NP   meloxicam (MOBIC) 15 MG tablet Take 1 tablet (15 mg total) by mouth once daily. 12/17/24 2/6/25  Debby Lin PA-C       Review of Systems:  Review of Systems   Constitutional:  Positive for fatigue.   Cardiovascular:  Negative for chest pain.   Endocrine: Positive for polyuria. Negative for polydipsia and polyphagia.   Musculoskeletal:  Positive for arthralgias (see HPI).   Skin:  Negative for pallor.   Neurological:  Positive for weakness. Negative for dizziness, tremors, seizures, speech difficulty and headaches.   Psychiatric/Behavioral:  Negative for confusion. The patient is nervous/anxious.        Health Maintainence:   Immunizations:  Health Maintenance         Date Due Completion Date    Mammogram 01/31/2025 1/31/2024    Hemoglobin A1c (Prediabetes) 02/05/2026 2/5/2025    Colorectal Cancer Screening 05/17/2028 5/17/2023    Lipid Panel 02/05/2030 2/5/2025    TETANUS VACCINE 08/29/2033 8/29/2023    RSV Vaccine " "(Age 60+ and Pregnant patients) (1 - 1-dose 75+ series) 12/14/2043 ---             PHYSICAL EXAM     BP (!) 120/94 (BP Location: Right arm, Patient Position: Sitting)   Pulse 78   Ht 5' 7" (1.702 m)   Wt 105.7 kg (233 lb 0.4 oz)   SpO2 98%   BMI 36.50 kg/m²  Body mass index is 36.5 kg/m².    Physical Exam  Vitals reviewed.   Constitutional:       Appearance: She is well-developed.   HENT:      Head: Normocephalic.      Right Ear: External ear normal.      Left Ear: External ear normal.      Nose: Nose normal.      Mouth/Throat:      Pharynx: No oropharyngeal exudate.   Eyes:      Pupils: Pupils are equal, round, and reactive to light.   Neck:      Thyroid: No thyromegaly.      Vascular: No JVD.      Trachea: No tracheal deviation.   Cardiovascular:      Rate and Rhythm: Normal rate and regular rhythm.      Heart sounds: Normal heart sounds. No murmur heard.     No friction rub. No gallop.   Pulmonary:      Effort: Pulmonary effort is normal. No respiratory distress.      Breath sounds: Normal breath sounds. No wheezing or rales.   Abdominal:      General: Bowel sounds are normal. There is no distension.      Palpations: Abdomen is soft.      Tenderness: There is no abdominal tenderness.   Musculoskeletal:         General: No tenderness. Normal range of motion.      Cervical back: Neck supple.   Lymphadenopathy:      Cervical: No cervical adenopathy.   Skin:     General: Skin is warm and dry.      Findings: No rash.   Neurological:      Mental Status: She is alert and oriented to person, place, and time.   Psychiatric:         Behavior: Behavior normal.         LABS     Lab Results   Component Value Date    HGBA1C 5.6 02/05/2025     CMP  Sodium   Date Value Ref Range Status   02/05/2025 139 136 - 145 mmol/L Final     Potassium   Date Value Ref Range Status   02/05/2025 4.6 3.5 - 5.1 mmol/L Final     Chloride   Date Value Ref Range Status   02/05/2025 105 95 - 110 mmol/L Final     CO2   Date Value Ref Range " Status   02/05/2025 26 23 - 29 mmol/L Final     Glucose   Date Value Ref Range Status   02/05/2025 116 (H) 70 - 110 mg/dL Final     BUN   Date Value Ref Range Status   02/05/2025 12 6 - 20 mg/dL Final     Creatinine   Date Value Ref Range Status   02/05/2025 0.8 0.5 - 1.4 mg/dL Final     Calcium   Date Value Ref Range Status   02/05/2025 9.6 8.7 - 10.5 mg/dL Final     Total Protein   Date Value Ref Range Status   02/05/2025 7.4 6.0 - 8.4 g/dL Final     Albumin   Date Value Ref Range Status   02/05/2025 4.2 3.5 - 5.2 g/dL Final     Total Bilirubin   Date Value Ref Range Status   02/05/2025 0.4 0.1 - 1.0 mg/dL Final     Comment:     For infants and newborns, interpretation of results should be based  on gestational age, weight and in agreement with clinical  observations.    Premature Infant recommended reference ranges:  Up to 24 hours.............<8.0 mg/dL  Up to 48 hours............<12.0 mg/dL  3-5 days..................<15.0 mg/dL  6-29 days.................<15.0 mg/dL       Alkaline Phosphatase   Date Value Ref Range Status   02/05/2025 48 40 - 150 U/L Final     AST   Date Value Ref Range Status   02/05/2025 14 10 - 40 U/L Final     ALT   Date Value Ref Range Status   02/05/2025 12 10 - 44 U/L Final     Anion Gap   Date Value Ref Range Status   02/05/2025 8 8 - 16 mmol/L Final     Lab Results   Component Value Date    WBC 7.17 02/05/2025    HGB 13.9 02/05/2025    HCT 42.6 02/05/2025    MCV 91 02/05/2025     02/05/2025     Lab Results   Component Value Date    CHOL 173 02/05/2025    CHOL 174 10/16/2024    CHOL 196 03/27/2023     Lab Results   Component Value Date    HDL 49 02/05/2025    HDL 46 10/16/2024    HDL 62 03/27/2023     Lab Results   Component Value Date    LDLCALC 100.4 02/05/2025    LDLCALC 89.0 10/16/2024    LDLCALC 111.8 03/27/2023     Lab Results   Component Value Date    TRIG 118 02/05/2025    TRIG 195 (H) 10/16/2024    TRIG 111 03/27/2023     Lab Results   Component Value Date    CHOLHDL  28.3 02/05/2025    CHOLHDL 26.4 10/16/2024    CHOLHDL 31.6 03/27/2023     Lab Results   Component Value Date    TSH 1.260 10/16/2024       ASSESSMENT/PLAN     Toya Mendoza is a 54 y.o. female    Annual physical exam    Anxiety  -     ALPRAZolam (XANAX) 0.25 MG tablet; Take 1 tablet (0.25 mg total) by mouth nightly as needed for Anxiety.  Dispense: 30 tablet; Refill: 0    Major depression, chronic    Hypertriglyceridemia  -     semaglutide, weight loss, 2.4 mg/0.75 mL PnIj; Inject 2.4 mg into the skin every 7 days.  Dispense: 0.75 mL; Refill: 3    Family history of colon cancer: older brother    Class 1 obesity without serious comorbidity with body mass index (BMI) of 34.0 to 34.9 in adult, unspecified obesity type    S/P laparoscopic sleeve gastrectomy    Prediabetes  -     semaglutide, weight loss, 2.4 mg/0.75 mL PnIj; Inject 2.4 mg into the skin every 7 days.  Dispense: 0.75 mL; Refill: 3    Primary osteoarthritis of right knee    Class 2 severe obesity with serious comorbidity and body mass index (BMI) of 36.0 to 36.9 in adult, unspecified obesity type  -     semaglutide, weight loss, 2.4 mg/0.75 mL PnIj; Inject 2.4 mg into the skin every 7 days.  Dispense: 0.75 mL; Refill: 3    Chronic heel pain, left  -     X-Ray Foot Complete 3 view Left; Future; Expected date: 02/06/2025    Class 1 obesity with body mass index (BMI) of 34.0 to 34.9 in adult, unspecified obesity type, unspecified whether serious comorbidity present    Left shoulder pain, unspecified chronicity  -     MRI Shoulder Without Contrast Left; Future; Expected date: 02/06/2025       Assessment & Plan    IMPRESSION:  - Assessed patient's reported improvements with non-psychedelic mushroom supplements for anti-inflammatory effects and mood  - Evaluated recurrence of panic attacks despite supplement use  - Assessed progression of elbow pain, initially diagnosed as tennis elbow by Dr. Garrido, now presenting in bicep area  - Evaluated potential  rotator cuff or bicep tendon involvement given pain location shift  - Assessed plantar fasciitis symptoms in left heel since Christmas  - Evaluated Wegovy efficacy and dosage  - Reviewed recent lab results, noting improved cholesterol, triglycerides, and blood sugar  - Considered B12 deficiency unlikely based on normal CBC despite pending B12 test results  - Assessed blood pressure, noting improvement to 120/70 during visit    ANXIETY AND PANIC ATTACKS:  - Evaluated the patient's recent experience of panic attacks and anxiety.  - Noted the patient's report of feeling agitated after discontinuing mushroom supplements for a week.  - Acknowledged the patient's anxiety attacks.  - Reinstated Xanax for management of panic attacks as per patient's request.    UPPER ARM AND ELBOW PAIN:  - Assessed the patient's report of elbow pain that has progressed to the bicep and upper arm, causing nocturnal discomfort.  - Noted the initial diagnosis of tennis elbow by an orthopedist based on x-ray results.  - Suggested the possibility of a rotator cuff or bicep tendon issue.  - Recommend a shoulder MRI for further evaluation.  - Continued meloxicam and acetaminophen (1000 mg) before bedtime for arm pain management.  - Advised follow-up with Dr. Garrido for reassessment of upper arm pain.  - Evaluated the patient's report of pain in the upper arm, possibly related to the shoulder.  - Ordered shoulder MRI to investigate upper arm pain.    HEEL PAIN:  - Evaluated the patient's report of heel pain since Christmas, localized to the bottom of the heel, resembling a bone bruise.  - Confirmed pain location on the bottom of the heel, not in the arch or back of the heel.  - Ordered x-ray of left heel to evaluate plantar fasciitis and rule out other conditions.  - Noted the patient is wearing flat shoes and avoiding heels.    WEIGHT MANAGEMENT:  - Noted the patient's report of regaining 20 lbs.  - Increased Wegovy dose from 1.7 mg to 2.4 mg for  weight management.  - [facility] for Wegovy prescription to ensure insurance coverage.    PREDIABETES:  - Evaluated the patient's fasting glucose, which is slightly elevated, indicating borderline prediabetes.  - Noted improvement in the patient's average blood sugar.    VITAMIN B12 DEFICIENCY:  - Noted that B12 test results are still in process.  - Evaluated CBC results, which are normal, including size and shape of RBCs.  - Continued B12 injections for treatment.    KNEE RECOVERY:  - Noted the patient's report that the knee is in full recovery from meniscus tear.    FOLLOW UP:  - Follow up to reschedule missed mammogram appointment.     This note was generated with the assistance of ambient listening technology. Verbal consent was obtained by the patient and accompanying visitor(s) for the recording of patient appointment to facilitate this note. I attest to having reviewed and edited the generated note for accuracy, though some syntax or spelling errors may persist. Please contact the author of this note for any clarification.    Follow up with PCP     Cris HIGHTOWER, APRN, FNP-c   Department of Internal Medicine - [facility]  1:45 PM    Answers submitted by the patient for this visit:  Diabetes Questionnaire (Submitted on 2/6/2025)  Chief Complaint: Diabetes problem  Diabetes type: type 2  MedicAlert ID: No  Disease duration: 24 Years  blurred vision: No  foot paresthesias: No  foot ulcerations: No  visual change: No  weight loss: No  Symptom course: stable  hunger: No  mood changes: No  sleepiness: No  sweats: No  blackouts: No  hospitalization: No  nocturnal hypoglycemia: No  required assistance: No  required glucagon: No  CVA: No  heart disease: No  nephropathy: No  peripheral neuropathy: No  PVD: No  retinopathy: No  autonomic neuropathy: No  CAD risks: dyslipidemia, family history, obesity, post-menopausal  Current treatments:  none  Treatment compliance: most of the time  Monitoring compliance: no compliance  Blood glucose trend: increasing steadily  Weight trend: stable  Current diet: generally healthy  Meal planning: carbohydrate counting  Exercise: three times a week  Dietitian visit: No  Eye exam current: Yes  Sees podiatrist: No

## 2025-02-07 DIAGNOSIS — M79.672 CHRONIC HEEL PAIN, LEFT: ICD-10-CM

## 2025-02-07 DIAGNOSIS — G89.29 CHRONIC HEEL PAIN, LEFT: ICD-10-CM

## 2025-02-07 DIAGNOSIS — E53.8 B12 DEFICIENCY: ICD-10-CM

## 2025-02-07 DIAGNOSIS — M77.32 CALCANEAL SPUR OF LEFT FOOT: Primary | ICD-10-CM

## 2025-02-07 RX ORDER — CYANOCOBALAMIN 1000 UG/ML
1000 INJECTION, SOLUTION INTRAMUSCULAR; SUBCUTANEOUS
Qty: 10 ML | Refills: 3 | Status: SHIPPED | OUTPATIENT
Start: 2025-02-07 | End: 2025-03-09

## 2025-02-08 ENCOUNTER — PATIENT MESSAGE (OUTPATIENT)
Dept: INTERNAL MEDICINE | Facility: CLINIC | Age: 55
End: 2025-02-08
Payer: COMMERCIAL

## 2025-02-15 ENCOUNTER — PATIENT MESSAGE (OUTPATIENT)
Dept: ORTHOPEDICS | Facility: CLINIC | Age: 55
End: 2025-02-15
Payer: COMMERCIAL

## 2025-03-16 DIAGNOSIS — E66.812 CLASS 2 SEVERE OBESITY WITH SERIOUS COMORBIDITY AND BODY MASS INDEX (BMI) OF 36.0 TO 36.9 IN ADULT, UNSPECIFIED OBESITY TYPE: ICD-10-CM

## 2025-03-16 DIAGNOSIS — E53.8 B12 DEFICIENCY: ICD-10-CM

## 2025-03-16 DIAGNOSIS — F41.9 ANXIETY: ICD-10-CM

## 2025-03-16 DIAGNOSIS — E66.01 CLASS 2 SEVERE OBESITY WITH SERIOUS COMORBIDITY AND BODY MASS INDEX (BMI) OF 36.0 TO 36.9 IN ADULT, UNSPECIFIED OBESITY TYPE: ICD-10-CM

## 2025-03-16 DIAGNOSIS — R73.03 PREDIABETES: ICD-10-CM

## 2025-03-16 DIAGNOSIS — E78.1 HYPERTRIGLYCERIDEMIA: ICD-10-CM

## 2025-03-18 RX ORDER — ALPRAZOLAM 0.25 MG/1
0.25 TABLET ORAL NIGHTLY PRN
Qty: 30 TABLET | Refills: 0 | Status: SHIPPED | OUTPATIENT
Start: 2025-03-18 | End: 2025-04-17

## 2025-03-18 RX ORDER — MELOXICAM 15 MG/1
15 TABLET ORAL DAILY
Qty: 30 TABLET | Refills: 2 | Status: SHIPPED | OUTPATIENT
Start: 2025-03-18

## 2025-03-18 RX ORDER — CYANOCOBALAMIN 1000 UG/ML
1000 INJECTION, SOLUTION INTRAMUSCULAR; SUBCUTANEOUS
Qty: 1 ML | Refills: 0 | Status: SHIPPED | OUTPATIENT
Start: 2025-03-18 | End: 2025-04-17

## 2025-03-23 DIAGNOSIS — R73.03 PREDIABETES: ICD-10-CM

## 2025-03-23 DIAGNOSIS — E78.1 HYPERTRIGLYCERIDEMIA: ICD-10-CM

## 2025-03-23 DIAGNOSIS — E66.811 CLASS 1 OBESITY WITH BODY MASS INDEX (BMI) OF 34.0 TO 34.9 IN ADULT, UNSPECIFIED OBESITY TYPE, UNSPECIFIED WHETHER SERIOUS COMORBIDITY PRESENT: ICD-10-CM

## 2025-03-24 RX ORDER — SEMAGLUTIDE 1.7 MG/.75ML
INJECTION, SOLUTION SUBCUTANEOUS
Qty: 3 ML | Refills: 1 | Status: SHIPPED | OUTPATIENT
Start: 2025-03-24

## 2025-03-25 DIAGNOSIS — L92.9 GRANULOMA, SKIN: ICD-10-CM

## 2025-03-25 RX ORDER — TRIAMCINOLONE ACETONIDE 1 MG/G
OINTMENT TOPICAL
Qty: 30 G | Refills: 1 | Status: SHIPPED | OUTPATIENT
Start: 2025-03-25

## 2025-05-01 DIAGNOSIS — E78.1 HYPERTRIGLYCERIDEMIA: ICD-10-CM

## 2025-05-01 DIAGNOSIS — R73.03 PREDIABETES: ICD-10-CM

## 2025-05-01 DIAGNOSIS — E53.8 B12 DEFICIENCY: ICD-10-CM

## 2025-05-01 DIAGNOSIS — E66.01 CLASS 2 SEVERE OBESITY WITH SERIOUS COMORBIDITY AND BODY MASS INDEX (BMI) OF 36.0 TO 36.9 IN ADULT, UNSPECIFIED OBESITY TYPE: ICD-10-CM

## 2025-05-01 DIAGNOSIS — E66.812 CLASS 2 SEVERE OBESITY WITH SERIOUS COMORBIDITY AND BODY MASS INDEX (BMI) OF 36.0 TO 36.9 IN ADULT, UNSPECIFIED OBESITY TYPE: ICD-10-CM

## 2025-05-01 RX ORDER — CYANOCOBALAMIN 1000 UG/ML
1000 INJECTION, SOLUTION INTRAMUSCULAR; SUBCUTANEOUS
Qty: 1 ML | Refills: 0 | Status: SHIPPED | OUTPATIENT
Start: 2025-05-01 | End: 2025-05-31

## 2025-06-02 ENCOUNTER — PATIENT MESSAGE (OUTPATIENT)
Dept: INTERNAL MEDICINE | Facility: CLINIC | Age: 55
End: 2025-06-02
Payer: COMMERCIAL

## 2025-06-02 DIAGNOSIS — E66.812 CLASS 2 SEVERE OBESITY WITH SERIOUS COMORBIDITY AND BODY MASS INDEX (BMI) OF 36.0 TO 36.9 IN ADULT, UNSPECIFIED OBESITY TYPE: ICD-10-CM

## 2025-06-02 DIAGNOSIS — R73.03 PREDIABETES: ICD-10-CM

## 2025-06-02 DIAGNOSIS — F41.9 ANXIETY: ICD-10-CM

## 2025-06-02 DIAGNOSIS — L92.9 GRANULOMA, SKIN: ICD-10-CM

## 2025-06-02 DIAGNOSIS — E78.1 HYPERTRIGLYCERIDEMIA: ICD-10-CM

## 2025-06-02 DIAGNOSIS — E66.01 CLASS 2 SEVERE OBESITY WITH SERIOUS COMORBIDITY AND BODY MASS INDEX (BMI) OF 36.0 TO 36.9 IN ADULT, UNSPECIFIED OBESITY TYPE: ICD-10-CM

## 2025-06-03 RX ORDER — TRIAMCINOLONE ACETONIDE 1 MG/G
OINTMENT TOPICAL 2 TIMES DAILY
Qty: 30 G | Refills: 1 | Status: SHIPPED | OUTPATIENT
Start: 2025-06-03

## 2025-06-03 RX ORDER — ALPRAZOLAM 0.25 MG/1
0.25 TABLET ORAL NIGHTLY PRN
Qty: 30 TABLET | Refills: 0 | Status: SHIPPED | OUTPATIENT
Start: 2025-06-03 | End: 2025-07-03

## 2025-06-09 ENCOUNTER — PATIENT MESSAGE (OUTPATIENT)
Dept: INTERNAL MEDICINE | Facility: CLINIC | Age: 55
End: 2025-06-09
Payer: COMMERCIAL

## 2025-06-09 DIAGNOSIS — E78.1 HYPERTRIGLYCERIDEMIA: ICD-10-CM

## 2025-06-09 DIAGNOSIS — F41.9 ANXIETY: ICD-10-CM

## 2025-06-09 DIAGNOSIS — E53.8 B12 DEFICIENCY: ICD-10-CM

## 2025-06-09 DIAGNOSIS — R73.03 PREDIABETES: ICD-10-CM

## 2025-06-09 DIAGNOSIS — E66.01 CLASS 2 SEVERE OBESITY WITH SERIOUS COMORBIDITY AND BODY MASS INDEX (BMI) OF 36.0 TO 36.9 IN ADULT, UNSPECIFIED OBESITY TYPE: ICD-10-CM

## 2025-06-09 DIAGNOSIS — L92.9 GRANULOMA, SKIN: ICD-10-CM

## 2025-06-09 DIAGNOSIS — E66.812 CLASS 2 SEVERE OBESITY WITH SERIOUS COMORBIDITY AND BODY MASS INDEX (BMI) OF 36.0 TO 36.9 IN ADULT, UNSPECIFIED OBESITY TYPE: ICD-10-CM

## 2025-06-09 DIAGNOSIS — E78.1 HYPERTRIGLYCERIDEMIA: Primary | ICD-10-CM

## 2025-06-09 DIAGNOSIS — M25.50 ARTHRALGIA, UNSPECIFIED JOINT: ICD-10-CM

## 2025-06-09 DIAGNOSIS — R68.89 FLUCTUATION OF WEIGHT: ICD-10-CM

## 2025-06-10 ENCOUNTER — PATIENT MESSAGE (OUTPATIENT)
Dept: INTERNAL MEDICINE | Facility: CLINIC | Age: 55
End: 2025-06-10
Payer: COMMERCIAL

## 2025-06-10 RX ORDER — CYANOCOBALAMIN 1000 UG/ML
1000 INJECTION, SOLUTION INTRAMUSCULAR; SUBCUTANEOUS
Qty: 1 ML | Refills: 0 | Status: SHIPPED | OUTPATIENT
Start: 2025-06-10 | End: 2025-07-10

## 2025-06-10 RX ORDER — TRIAMCINOLONE ACETONIDE 1 MG/G
OINTMENT TOPICAL 2 TIMES DAILY
Qty: 30 G | Refills: 1 | Status: SHIPPED | OUTPATIENT
Start: 2025-06-10

## 2025-06-10 RX ORDER — MELOXICAM 15 MG/1
15 TABLET ORAL DAILY
Qty: 30 TABLET | Refills: 2 | Status: SHIPPED | OUTPATIENT
Start: 2025-06-10

## 2025-06-10 RX ORDER — ALPRAZOLAM 0.25 MG/1
0.25 TABLET ORAL NIGHTLY PRN
Qty: 30 TABLET | Refills: 0 | Status: SHIPPED | OUTPATIENT
Start: 2025-06-10 | End: 2025-07-10

## 2025-07-15 ENCOUNTER — PATIENT MESSAGE (OUTPATIENT)
Dept: INTERNAL MEDICINE | Facility: CLINIC | Age: 55
End: 2025-07-15
Payer: COMMERCIAL

## 2025-07-15 ENCOUNTER — LAB VISIT (OUTPATIENT)
Dept: LAB | Facility: HOSPITAL | Age: 55
End: 2025-07-15
Payer: COMMERCIAL

## 2025-07-15 DIAGNOSIS — E53.8 B12 DEFICIENCY: ICD-10-CM

## 2025-07-15 DIAGNOSIS — F41.9 ANXIETY: ICD-10-CM

## 2025-07-15 DIAGNOSIS — R73.03 PREDIABETES: ICD-10-CM

## 2025-07-15 DIAGNOSIS — E78.1 HYPERTRIGLYCERIDEMIA: ICD-10-CM

## 2025-07-15 DIAGNOSIS — R68.89 FLUCTUATION OF WEIGHT: ICD-10-CM

## 2025-07-15 DIAGNOSIS — M25.50 ARTHRALGIA, UNSPECIFIED JOINT: ICD-10-CM

## 2025-07-15 LAB
ABSOLUTE EOSINOPHIL (OHS): 0.5 K/UL
ABSOLUTE MONOCYTE (OHS): 0.52 K/UL (ref 0.3–1)
ABSOLUTE NEUTROPHIL COUNT (OHS): 3.19 K/UL (ref 1.8–7.7)
ALBUMIN SERPL BCP-MCNC: 4.3 G/DL (ref 3.5–5.2)
ALP SERPL-CCNC: 46 UNIT/L (ref 40–150)
ALT SERPL W/O P-5'-P-CCNC: 15 UNIT/L (ref 10–44)
ANION GAP (OHS): 9 MMOL/L (ref 8–16)
AST SERPL-CCNC: 14 UNIT/L (ref 11–45)
BASOPHILS # BLD AUTO: 0.06 K/UL
BASOPHILS NFR BLD AUTO: 0.9 %
BILIRUB SERPL-MCNC: 0.5 MG/DL (ref 0.1–1)
BUN SERPL-MCNC: 12 MG/DL (ref 6–20)
CALCIUM SERPL-MCNC: 9.5 MG/DL (ref 8.7–10.5)
CHLORIDE SERPL-SCNC: 104 MMOL/L (ref 95–110)
CO2 SERPL-SCNC: 27 MMOL/L (ref 23–29)
CREAT SERPL-MCNC: 0.7 MG/DL (ref 0.5–1.4)
CRP SERPL-MCNC: 2 MG/L
EAG (OHS): 103 MG/DL (ref 68–131)
ERYTHROCYTE [DISTWIDTH] IN BLOOD BY AUTOMATED COUNT: 14 % (ref 11.5–14.5)
ERYTHROCYTE [SEDIMENTATION RATE] IN BLOOD BY PHOTOMETRIC METHOD: 5 MM/HR
GFR SERPLBLD CREATININE-BSD FMLA CKD-EPI: >60 ML/MIN/1.73/M2
GLUCOSE SERPL-MCNC: 98 MG/DL (ref 70–110)
HBA1C MFR BLD: 5.2 % (ref 4–5.6)
HCT VFR BLD AUTO: 41.2 % (ref 37–48.5)
HGB BLD-MCNC: 13.6 GM/DL (ref 12–16)
IMM GRANULOCYTES # BLD AUTO: 0.01 K/UL (ref 0–0.04)
IMM GRANULOCYTES NFR BLD AUTO: 0.1 % (ref 0–0.5)
LYMPHOCYTES # BLD AUTO: 2.71 K/UL (ref 1–4.8)
MCH RBC QN AUTO: 30.4 PG (ref 27–31)
MCHC RBC AUTO-ENTMCNC: 33 G/DL (ref 32–36)
MCV RBC AUTO: 92 FL (ref 82–98)
NUCLEATED RBC (/100WBC) (OHS): 0 /100 WBC
PLATELET # BLD AUTO: 299 K/UL (ref 150–450)
PMV BLD AUTO: 10.3 FL (ref 9.2–12.9)
POTASSIUM SERPL-SCNC: 4.4 MMOL/L (ref 3.5–5.1)
PROT SERPL-MCNC: 7.1 GM/DL (ref 6–8.4)
RBC # BLD AUTO: 4.48 M/UL (ref 4–5.4)
RELATIVE EOSINOPHIL (OHS): 7.2 %
RELATIVE LYMPHOCYTE (OHS): 38.8 % (ref 18–48)
RELATIVE MONOCYTE (OHS): 7.4 % (ref 4–15)
RELATIVE NEUTROPHIL (OHS): 45.6 % (ref 38–73)
SODIUM SERPL-SCNC: 140 MMOL/L (ref 136–145)
VIT B12 SERPL-MCNC: 199 PG/ML (ref 210–950)
WBC # BLD AUTO: 6.99 K/UL (ref 3.9–12.7)

## 2025-07-15 PROCEDURE — 83036 HEMOGLOBIN GLYCOSYLATED A1C: CPT

## 2025-07-15 PROCEDURE — 86038 ANTINUCLEAR ANTIBODIES: CPT

## 2025-07-15 PROCEDURE — 85025 COMPLETE CBC W/AUTO DIFF WBC: CPT

## 2025-07-15 PROCEDURE — 80053 COMPREHEN METABOLIC PANEL: CPT

## 2025-07-15 PROCEDURE — 82607 VITAMIN B-12: CPT

## 2025-07-15 PROCEDURE — 85652 RBC SED RATE AUTOMATED: CPT

## 2025-07-15 PROCEDURE — 36415 COLL VENOUS BLD VENIPUNCTURE: CPT

## 2025-07-15 PROCEDURE — 86140 C-REACTIVE PROTEIN: CPT

## 2025-07-15 RX ORDER — CYANOCOBALAMIN 1000 UG/ML
1000 INJECTION, SOLUTION INTRAMUSCULAR; SUBCUTANEOUS
Qty: 1 ML | Refills: 0 | Status: CANCELLED | OUTPATIENT
Start: 2025-07-15 | End: 2025-08-14

## 2025-07-15 RX ORDER — ALPRAZOLAM 0.25 MG/1
0.25 TABLET ORAL NIGHTLY PRN
Qty: 30 TABLET | Refills: 0 | Status: SHIPPED | OUTPATIENT
Start: 2025-07-15 | End: 2025-08-15

## 2025-07-16 ENCOUNTER — OFFICE VISIT (OUTPATIENT)
Dept: INTERNAL MEDICINE | Facility: CLINIC | Age: 55
End: 2025-07-16
Payer: COMMERCIAL

## 2025-07-16 ENCOUNTER — PATIENT MESSAGE (OUTPATIENT)
Dept: INTERNAL MEDICINE | Facility: CLINIC | Age: 55
End: 2025-07-16

## 2025-07-16 ENCOUNTER — HOSPITAL ENCOUNTER (OUTPATIENT)
Dept: RADIOLOGY | Facility: HOSPITAL | Age: 55
Discharge: HOME | End: 2025-07-16
Attending: INTERNAL MEDICINE
Payer: COMMERCIAL

## 2025-07-16 VITALS
SYSTOLIC BLOOD PRESSURE: 102 MMHG | HEIGHT: 68 IN | DIASTOLIC BLOOD PRESSURE: 70 MMHG | HEART RATE: 98 BPM | WEIGHT: 250.25 LBS | OXYGEN SATURATION: 97 % | BODY MASS INDEX: 37.93 KG/M2

## 2025-07-16 DIAGNOSIS — E53.8 B12 DEFICIENCY: Primary | ICD-10-CM

## 2025-07-16 DIAGNOSIS — Z12.31 ENCOUNTER FOR SCREENING MAMMOGRAM FOR BREAST CANCER: ICD-10-CM

## 2025-07-16 LAB — ANA (OHS): NORMAL

## 2025-07-16 PROCEDURE — 99999 PR PBB SHADOW E&M-EST. PATIENT-LVL III: CPT | Mod: PBBFAC,,,

## 2025-07-16 PROCEDURE — 77063 BREAST TOMOSYNTHESIS BI: CPT | Mod: TC

## 2025-07-16 RX ORDER — MELOXICAM 15 MG/1
15 TABLET ORAL DAILY
Qty: 30 TABLET | Refills: 2 | Status: SHIPPED | OUTPATIENT
Start: 2025-07-16

## 2025-07-16 RX ORDER — CYANOCOBALAMIN 1000 UG/ML
1000 INJECTION, SOLUTION INTRAMUSCULAR; SUBCUTANEOUS ONCE
Qty: 1 ML | Refills: 0 | Status: SHIPPED | OUTPATIENT
Start: 2025-07-16 | End: 2025-07-17

## 2025-07-16 NOTE — PROGRESS NOTES
Subjective     Chief Complaint: Urgent Care-    History of Present Illness:  Ms. Toya Mendoza is a 54 y.o. female w/ low B!2, here to discuss labs. Foggy. Joint issues 2/2 running. States skins are hypersensitive.      Denies GI issues, band disengaged 8 years ago. 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 Hives and hair falling out.     On Mobic for knee issues. Tylenol. Xanax and semiglutide.     ROS as per HPI    PAST HISTORY:     Past Medical History:   Diagnosis Date    Anemia     Anxiety     Depression     Diabetes mellitus, type 2     Hypertriglyceridemia 10/17/2024    Migraine        Past Surgical History:   Procedure Laterality Date    ADENOIDECTOMY       SECTION      x 2    CHOLECYSTECTOMY      COLONOSCOPY N/A 2023    Procedure: COLONOSCOPY;  Surgeon: Pierce Silva MD;  Location: [facility] ENDOSCOPY;  Service: Endoscopy;  Laterality: N/A;  referred by Cris Egan NP for abdominal pain and gi hemorrhage unspecified suprep instructions sent to pt portal.cf   pre-call attempted; no answer left message; MS    COSMETIC SURGERY      EYE SURGERY      HYSTERECTOMY      LAPAROSCOPIC GASTRIC BANDING      OOPHORECTOMY      TONSILLECTOMY         Family History   Problem Relation Name Age of Onset    Cancer Mother Francia Kam         kidney    Diabetes Mother Franciasatish Kam     Arthritis Mother Franciasatish Kam     Depression Mother Franciasatish Kam     Thyroid disease Father Kodak Newman     Heart disease Father Kodak Newman         MI    Alcohol abuse Father Kodak Newman     Ovarian cancer Sister      Cancer Sister          cervical    No Known Problems Daughter      Diabetes Maternal Grandmother      Diabetes Paternal Grandmother      Cancer Brother  50        colon    Diabetes Brother      No Known Problems Son         Social History     Socioeconomic History    Marital status:     Number of children: 2   Tobacco Use    Smoking status: Light Smoker     Current packs/day: 0.25      "Average packs/day: 0.3 packs/day for 2.0 years (0.5 ttl pk-yrs)     Types: Cigarettes    Smokeless tobacco: Never   Substance and Sexual Activity    Alcohol use: Yes     Alcohol/week: 8.0 standard drinks of alcohol     Types: 4 Glasses of wine, 4 Drinks containing 0.5 oz of alcohol per week     Comment: 8-14 drinks per week    Drug use: Yes     Frequency: 2.0 times per week     Types: Marijuana     Comment: for sleep - insomnia - edible    Sexual activity: Yes     Partners: Male     Birth control/protection: Condom     Social Drivers of Health     Financial Resource Strain: Low Risk  (7/16/2025)    Overall Financial Resource Strain (CARDIA)     Difficulty of Paying Living Expenses: Not hard at all   Food Insecurity: No Food Insecurity (7/16/2025)    Hunger Vital Sign     Worried About Running Out of Food in the Last Year: Never true     Ran Out of Food in the Last Year: Never true   Transportation Needs: No Transportation Needs (7/16/2025)    PRAPARE - Transportation     Lack of Transportation (Medical): No     Lack of Transportation (Non-Medical): No   Physical Activity: Insufficiently Active (7/16/2025)    Exercise Vital Sign     Days of Exercise per Week: 2 days     Minutes of Exercise per Session: 30 min   Stress: Stress Concern Present (7/16/2025)    [employer] - Occupational Stress Questionnaire     Feeling of Stress : To some extent   Housing Stability: Low Risk  (7/16/2025)    Housing Stability Vital Sign     Unable to Pay for Housing in the Last Year: No     Number of Times Moved in the Last Year: 0     Homeless in the Last Year: No       MEDICATIONS & ALLERGIES:     Current Outpatient Medications on File Prior to Visit   Medication Sig    ALPRAZolam (XANAX) 0.25 MG tablet Take 1 tablet (0.25 mg total) by mouth nightly as needed for Anxiety.    semaglutide, weight loss, 2.4 mg/0.75 mL PnIj Inject 2.4 mg into the skin every 7 days.    syringe with needle 1 mL 22 gauge x 1 1/2" " "Syrg 1 each by Misc.(Non-Drug; Combo Route) route every 28 days.    triamcinolone acetonide 0.1% (KENALOG) 0.1 % ointment Apply topically 2 (two) times daily.    WEGOVY 1.7 mg/0.75 mL PnIj INJECT 1.7MG UNDER THE SKIN EVERY SEVEN DAYS     Current Facility-Administered Medications on File Prior to Visit   Medication    cyanocobalamin injection 1,000 mcg       Review of patient's allergies indicates:   Allergen Reactions    Codeine Nausea Only     nausea    Augmentin [amoxicillin-pot clavulanate] Nausea And Vomiting       OBJECTIVE:     Vital Signs:  Vitals:    07/16/25 1413   BP: 102/70   BP Location: Right arm   Patient Position: Sitting   Pulse: 98   SpO2: 97%   Weight: 113.5 kg (250 lb 3.6 oz)   Height: 5' 8" (1.727 m)       Body mass index is 38.05 kg/m².     Physical Exam    Laboratory  No results found for this or any previous visit (from the past 24 hours).    Diagnostic Results:      Health Maintenance Due   Topic Date Due    Mammogram  01/31/2025         ASSESSMENT & PLAN:     1. B12 deficiency  -     Folate; Future; Expected date: 07/16/2025  -     Methylmalonic Acid, Serum; Future; Expected date: 07/16/2025  -     E-Consult to Endocrinology    Other orders  -     cyanocobalamin 1,000 mcg/mL injection; Inject 1 mL (1,000 mcg total) into the muscle once. for 1 dose, monthly.  Dispense: 1 mL; Refill: 0  -     meloxicam (MOBIC) 15 MG tablet; Take 1 tablet (15 mg total) by mouth once daily.  Dispense: 30 tablet; Refill: 2        Ms. Toya Mendoza is a 54 y.o. female who presents as an UC visit today wanted to follow up on B12 deficiency. 1 shot given, patient can follow up with known pcp. B12 deficiency workup incomplete. Will order MMA, Folate and e consult to Endocrine for further eval. Will contact patient next week with results. Rest of work up and management as per PCP.     See HPI above for further detail.    RTC PRN    Discussed with Dr Crispin Holly -- staff attestation to follow      Gary " DO Gregory MPH  Internal Medicine PGY-3  [facility]

## 2025-07-22 ENCOUNTER — E-CONSULT (OUTPATIENT)
Dept: INTERNAL MEDICINE | Facility: CLINIC | Age: 55
End: 2025-07-22
Payer: COMMERCIAL

## 2025-07-22 DIAGNOSIS — E53.8 B12 DEFICIENCY: Primary | ICD-10-CM

## 2025-07-24 ENCOUNTER — PATIENT MESSAGE (OUTPATIENT)
Dept: ADMINISTRATIVE | Facility: HOSPITAL | Age: 55
End: 2025-07-24
Payer: COMMERCIAL

## 2025-07-24 NOTE — CONSULTS
[facility]  Response for E-Consult     Patient Name: Toya Mendoza  MRN: [id number]  Primary Care Provider: Cris Peralta NP   Requesting Provider: Gary Jenkins DO  Consults    Unfortunately, this eConsult has been declined due to: Other    Other:  This eConsult submission cannot be completed at this time due to will discuss with pt at follow up.      Thank you for this eConsult referral.     [facility]

## 2025-07-25 ENCOUNTER — E-CONSULT (OUTPATIENT)
Dept: ENDOCRINOLOGY | Facility: CLINIC | Age: 55
End: 2025-07-25
Payer: COMMERCIAL

## 2025-07-25 DIAGNOSIS — E53.8 VITAMIN B12 DEFICIENCY: Primary | ICD-10-CM

## 2025-07-25 NOTE — CONSULTS
[facility]  Response for E-Consult     Patient Name: Toya Mendoza  MRN: [id number]  Primary Care Provider: Cris Peralta NP   Requesting Provider: Gary Jenkins DO  E-Consult to Endocrinology  Consult performed by: Esequiel Markham DO  Consult ordered by: Gary Jenkins DO  Reason for consult: Vit B12 concerns  Assessment/Recommendations: See Note - consider different speciality involvement GI?      Unfortunately, this eConsult has been declined due to: Unclear Question    Unclear Question:  This eConsult submission cannot be completed at this time period. The question provided is unclear and our specialty is unsure how to assist. Please consider providing more information, or a more specific question and resubmitting your eConsult, or contacting our specialty through the usual channels.    The only instance where endocrine would maybe have some involvement with Vitamin B12 deficiency would be in the setting of long term metformin use which would only involve replacement as it is a known complication.  No long term metformin use is noted on chart review.  Outside of that history I do not believe endocrinology will have a clear reason.  Another speciality may have better info outside of endocrine causes.      Thank you for this eConsult referral.     DO [facility]

## 2025-08-13 ENCOUNTER — PATIENT MESSAGE (OUTPATIENT)
Dept: INTERNAL MEDICINE | Facility: CLINIC | Age: 55
End: 2025-08-13
Payer: COMMERCIAL

## 2025-08-13 DIAGNOSIS — E53.8 B12 DEFICIENCY: Primary | ICD-10-CM

## 2025-08-13 DIAGNOSIS — F41.9 ANXIETY: ICD-10-CM

## 2025-08-13 DIAGNOSIS — M25.512 LEFT SHOULDER PAIN, UNSPECIFIED CHRONICITY: ICD-10-CM

## 2025-08-15 ENCOUNTER — PATIENT MESSAGE (OUTPATIENT)
Dept: INTERNAL MEDICINE | Facility: CLINIC | Age: 55
End: 2025-08-15
Payer: COMMERCIAL

## 2025-08-15 ENCOUNTER — E-CONSULT (OUTPATIENT)
Dept: HEMATOLOGY/ONCOLOGY | Facility: CLINIC | Age: 55
End: 2025-08-15
Payer: COMMERCIAL

## 2025-08-15 DIAGNOSIS — E53.8 DIETARY B12 DEFICIENCY: Primary | ICD-10-CM

## 2025-08-15 RX ORDER — CYANOCOBALAMIN 1000 UG/ML
1000 INJECTION, SOLUTION INTRAMUSCULAR; SUBCUTANEOUS
Qty: 10 ML | Refills: 2 | Status: SHIPPED | OUTPATIENT
Start: 2025-08-15

## 2025-08-15 RX ORDER — ALPRAZOLAM 0.25 MG/1
0.25 TABLET ORAL NIGHTLY PRN
Qty: 30 TABLET | Refills: 0 | Status: SHIPPED | OUTPATIENT
Start: 2025-08-15 | End: 2025-09-14

## 2025-08-15 RX ORDER — MELOXICAM 15 MG/1
15 TABLET ORAL DAILY
Qty: 30 TABLET | Refills: 2 | Status: SHIPPED | OUTPATIENT
Start: 2025-08-15